# Patient Record
Sex: FEMALE | Race: WHITE | HISPANIC OR LATINO | Employment: UNEMPLOYED | URBAN - METROPOLITAN AREA
[De-identification: names, ages, dates, MRNs, and addresses within clinical notes are randomized per-mention and may not be internally consistent; named-entity substitution may affect disease eponyms.]

---
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
## 2024-02-20 ENCOUNTER — HOSPITAL ENCOUNTER (OUTPATIENT)
Dept: LAB | Facility: MEDICAL CENTER | Age: 24
End: 2024-02-20
Attending: NURSE PRACTITIONER
Payer: COMMERCIAL

## 2024-02-20 ENCOUNTER — HOSPITAL ENCOUNTER (EMERGENCY)
Facility: MEDICAL CENTER | Age: 24
End: 2024-02-20
Attending: OBSTETRICS & GYNECOLOGY | Admitting: OBSTETRICS & GYNECOLOGY
Payer: COMMERCIAL

## 2024-02-20 VITALS
RESPIRATION RATE: 16 BRPM | HEIGHT: 62 IN | TEMPERATURE: 97.4 F | HEART RATE: 66 BPM | OXYGEN SATURATION: 98 % | BODY MASS INDEX: 33.13 KG/M2 | SYSTOLIC BLOOD PRESSURE: 142 MMHG | WEIGHT: 180 LBS | DIASTOLIC BLOOD PRESSURE: 67 MMHG

## 2024-02-20 LAB
ALBUMIN SERPL BCP-MCNC: 3.6 G/DL (ref 3.2–4.9)
ALBUMIN/GLOB SERPL: 1 G/DL
ALP SERPL-CCNC: 193 U/L (ref 30–99)
ALT SERPL-CCNC: 14 U/L (ref 2–50)
ANION GAP SERPL CALC-SCNC: 13 MMOL/L (ref 7–16)
APPEARANCE UR: ABNORMAL
AST SERPL-CCNC: 22 U/L (ref 12–45)
BACTERIA #/AREA URNS HPF: ABNORMAL /HPF
BACTERIA #/AREA URNS HPF: ABNORMAL /HPF
BILIRUB SERPL-MCNC: 0.3 MG/DL (ref 0.1–1.5)
BILIRUB UR QL STRIP.AUTO: NEGATIVE
BILIRUB UR QL STRIP.AUTO: NEGATIVE
BUN SERPL-MCNC: 6 MG/DL (ref 8–22)
CALCIUM ALBUM COR SERPL-MCNC: 9.2 MG/DL (ref 8.5–10.5)
CALCIUM SERPL-MCNC: 8.9 MG/DL (ref 8.5–10.5)
CHLORIDE SERPL-SCNC: 106 MMOL/L (ref 96–112)
CO2 SERPL-SCNC: 18 MMOL/L (ref 20–33)
COLOR UR AUTO: YELLOW
COLOR UR: YELLOW
COLOR UR: YELLOW
CREAT SERPL-MCNC: 0.44 MG/DL (ref 0.5–1.4)
CREAT UR-MCNC: 148.4 MG/DL
EPI CELLS #/AREA URNS HPF: ABNORMAL /HPF
EPI CELLS #/AREA URNS HPF: ABNORMAL /HPF
ERYTHROCYTE [DISTWIDTH] IN BLOOD BY AUTOMATED COUNT: 40.5 FL (ref 35.9–50)
GFR SERPLBLD CREATININE-BSD FMLA CKD-EPI: 140 ML/MIN/1.73 M 2
GLOBULIN SER CALC-MCNC: 3.5 G/DL (ref 1.9–3.5)
GLUCOSE SERPL-MCNC: 75 MG/DL (ref 65–99)
GLUCOSE UR QL STRIP.AUTO: NEGATIVE MG/DL
GLUCOSE UR STRIP.AUTO-MCNC: NEGATIVE MG/DL
GLUCOSE UR STRIP.AUTO-MCNC: NEGATIVE MG/DL
HCT VFR BLD AUTO: 40.3 % (ref 37–47)
HGB BLD-MCNC: 13.7 G/DL (ref 12–16)
KETONES UR QL STRIP.AUTO: NEGATIVE MG/DL
KETONES UR STRIP.AUTO-MCNC: NEGATIVE MG/DL
KETONES UR STRIP.AUTO-MCNC: NEGATIVE MG/DL
LEUKOCYTE ESTERASE UR QL STRIP.AUTO: ABNORMAL
MCH RBC QN AUTO: 28.5 PG (ref 27–33)
MCHC RBC AUTO-ENTMCNC: 34 G/DL (ref 32.2–35.5)
MCV RBC AUTO: 84 FL (ref 81.4–97.8)
MICRO URNS: ABNORMAL
MICRO URNS: ABNORMAL
NITRITE UR QL STRIP.AUTO: NEGATIVE
PH UR STRIP.AUTO: 6.5 [PH] (ref 5–8)
PH UR STRIP.AUTO: 6.5 [PH] (ref 5–8)
PH UR STRIP.AUTO: 7 [PH] (ref 5–8)
PLATELET # BLD AUTO: 242 K/UL (ref 164–446)
PMV BLD AUTO: 10 FL (ref 9–12.9)
POTASSIUM SERPL-SCNC: 4.2 MMOL/L (ref 3.6–5.5)
PROT SERPL-MCNC: 7.1 G/DL (ref 6–8.2)
PROT UR QL STRIP: 30 MG/DL
PROT UR QL STRIP: NEGATIVE MG/DL
PROT UR QL STRIP: NEGATIVE MG/DL
PROT UR-MCNC: 35 MG/DL (ref 0–15)
PROT/CREAT UR: 236 MG/G (ref 10–107)
RBC # BLD AUTO: 4.8 M/UL (ref 4.2–5.4)
RBC # URNS HPF: ABNORMAL /HPF
RBC # URNS HPF: ABNORMAL /HPF
RBC UR QL AUTO: ABNORMAL
RBC UR QL AUTO: ABNORMAL
RBC UR QL AUTO: NEGATIVE
SODIUM SERPL-SCNC: 137 MMOL/L (ref 135–145)
SP GR UR STRIP.AUTO: 1.01
SP GR UR STRIP.AUTO: 1.02
SP GR UR STRIP.AUTO: 1.02 (ref 1–1.03)
URATE SERPL-MCNC: 4.9 MG/DL (ref 1.9–8.2)
UROBILINOGEN UR STRIP.AUTO-MCNC: 0.2 MG/DL
UROBILINOGEN UR STRIP.AUTO-MCNC: 0.2 MG/DL
WBC # BLD AUTO: 9.8 K/UL (ref 4.8–10.8)
WBC #/AREA URNS HPF: ABNORMAL /HPF
WBC #/AREA URNS HPF: ABNORMAL /HPF

## 2024-02-20 PROCEDURE — 84550 ASSAY OF BLOOD/URIC ACID: CPT

## 2024-02-20 PROCEDURE — 87077 CULTURE AEROBIC IDENTIFY: CPT

## 2024-02-20 PROCEDURE — 82570 ASSAY OF URINE CREATININE: CPT

## 2024-02-20 PROCEDURE — 302449 STATCHG TRIAGE ONLY (STATISTIC)

## 2024-02-20 PROCEDURE — 59025 FETAL NON-STRESS TEST: CPT

## 2024-02-20 PROCEDURE — 80053 COMPREHEN METABOLIC PANEL: CPT

## 2024-02-20 PROCEDURE — 81001 URINALYSIS AUTO W/SCOPE: CPT

## 2024-02-20 PROCEDURE — 84156 ASSAY OF PROTEIN URINE: CPT

## 2024-02-20 PROCEDURE — 36415 COLL VENOUS BLD VENIPUNCTURE: CPT

## 2024-02-20 PROCEDURE — 81002 URINALYSIS NONAUTO W/O SCOPE: CPT

## 2024-02-20 PROCEDURE — 81001 URINALYSIS AUTO W/SCOPE: CPT | Mod: 91

## 2024-02-20 PROCEDURE — 87086 URINE CULTURE/COLONY COUNT: CPT

## 2024-02-20 PROCEDURE — 85027 COMPLETE CBC AUTOMATED: CPT

## 2024-02-20 ASSESSMENT — FIBROSIS 4 INDEX: FIB4 SCORE: .5345224838248487693

## 2024-02-20 ASSESSMENT — PAIN SCALES - GENERAL: PAINLEVEL: 0 - NO PAIN

## 2024-02-21 NOTE — PROGRESS NOTES
G 1 P 0 EDC 3/6/24 @ 37.6 WKS     1906 - Pt and FOB ambulate to LDA 2 in stable condition. Patient presents to L & D after being told by provider to stop by to have her BP assessed while visiting the hospital today. Patient denies CTX/LOF/VB, affirms + FM. Patient denies HA/RUQ pain/Visual changes, Clonus absent, DTR WNL. VSS, BP set to cycle. Call light within reach. Patient provided with PO hydration.     1938 - Report given to Dr.Sta Pitts. Orders to monitor, obtain NST. Discharge if VSS, NST reactive. Pt educated regarding symptoms to monitor for, assessing BP and when to return. All questions answered, no further needs stated at this time.     2034 - Dr.Sta Pitts given report. Discharge order received.     2104 - Discharge instructions, labor precautions, FKC, PIH symptom monitoring, follow up and when to return reviewed with pt and FOB. All questions answered, no further needs stated at this time.     2107 - Pt and FOB ambulate off unit in stable condition at this time.

## 2024-02-22 ENCOUNTER — HOSPITAL ENCOUNTER (OUTPATIENT)
Facility: MEDICAL CENTER | Age: 24
End: 2024-02-22
Attending: OBSTETRICS & GYNECOLOGY
Payer: COMMERCIAL

## 2024-02-22 PROCEDURE — 84156 ASSAY OF PROTEIN URINE: CPT

## 2024-02-22 PROCEDURE — 81050 URINALYSIS VOLUME MEASURE: CPT

## 2024-02-22 PROCEDURE — 82575 CREATININE CLEARANCE TEST: CPT

## 2024-02-23 LAB
BACTERIA UR CULT: ABNORMAL
BACTERIA UR CULT: ABNORMAL
COLLECT DURATION TIME UR: 24 HR
CREAT CL/1.73 SQ M ?TM UR+SERPL-ARVRAT: 277 ML/MIN (ref 88–128)
CREAT SERPL-MCNC: 0.5 MG/DL (ref 0.5–1.4)
CREAT UR-MCNC: 71.86 MG/DL
PROT 24H UR-MCNC: 174 MG/24 HR (ref 30–150)
PROT 24H UR-MRATE: 12 MG/DL (ref 0–15)
SIGNIFICANT IND 70042: ABNORMAL
SITE SITE: ABNORMAL
SOURCE SOURCE: ABNORMAL
SPECIMEN VOL UR: 1450 ML
SPECIMEN VOL UR: 3150 ML
URINE CREATININE EXCRETED 1125: 2264 MG/24 HR

## 2024-02-28 ENCOUNTER — ANESTHESIA (OUTPATIENT)
Dept: OBGYN | Facility: MEDICAL CENTER | Age: 24
End: 2024-02-28
Payer: COMMERCIAL

## 2024-02-28 ENCOUNTER — HOSPITAL ENCOUNTER (INPATIENT)
Facility: MEDICAL CENTER | Age: 24
LOS: 4 days | End: 2024-03-03
Attending: OBSTETRICS & GYNECOLOGY | Admitting: OBSTETRICS & GYNECOLOGY
Payer: COMMERCIAL

## 2024-02-28 ENCOUNTER — ANESTHESIA EVENT (OUTPATIENT)
Dept: OBGYN | Facility: MEDICAL CENTER | Age: 24
End: 2024-02-28
Payer: COMMERCIAL

## 2024-02-28 DIAGNOSIS — G89.18 POSTOPERATIVE PAIN: ICD-10-CM

## 2024-02-28 LAB
ALBUMIN SERPL BCP-MCNC: 3.5 G/DL (ref 3.2–4.9)
ALBUMIN/GLOB SERPL: 0.9 G/DL
ALP SERPL-CCNC: 209 U/L (ref 30–99)
ALT SERPL-CCNC: 14 U/L (ref 2–50)
ANION GAP SERPL CALC-SCNC: 15 MMOL/L (ref 7–16)
AST SERPL-CCNC: 20 U/L (ref 12–45)
BASOPHILS # BLD AUTO: 0.2 % (ref 0–1.8)
BASOPHILS # BLD: 0.02 K/UL (ref 0–0.12)
BILIRUB SERPL-MCNC: 0.2 MG/DL (ref 0.1–1.5)
BUN SERPL-MCNC: 9 MG/DL (ref 8–22)
CALCIUM ALBUM COR SERPL-MCNC: 9.6 MG/DL (ref 8.5–10.5)
CALCIUM SERPL-MCNC: 9.2 MG/DL (ref 8.5–10.5)
CHLORIDE SERPL-SCNC: 106 MMOL/L (ref 96–112)
CO2 SERPL-SCNC: 17 MMOL/L (ref 20–33)
CREAT SERPL-MCNC: 0.37 MG/DL (ref 0.5–1.4)
CREAT UR-MCNC: 42.39 MG/DL
EOSINOPHIL # BLD AUTO: 0.03 K/UL (ref 0–0.51)
EOSINOPHIL NFR BLD: 0.2 % (ref 0–6.9)
ERYTHROCYTE [DISTWIDTH] IN BLOOD BY AUTOMATED COUNT: 39.7 FL (ref 35.9–50)
GFR SERPLBLD CREATININE-BSD FMLA CKD-EPI: 146 ML/MIN/1.73 M 2
GLOBULIN SER CALC-MCNC: 3.8 G/DL (ref 1.9–3.5)
GLUCOSE SERPL-MCNC: 85 MG/DL (ref 65–99)
HCT VFR BLD AUTO: 42.4 % (ref 37–47)
HGB BLD-MCNC: 14.4 G/DL (ref 12–16)
HOLDING TUBE BB 8507: NORMAL
IMM GRANULOCYTES # BLD AUTO: 0.08 K/UL (ref 0–0.11)
IMM GRANULOCYTES NFR BLD AUTO: 0.7 % (ref 0–0.9)
LYMPHOCYTES # BLD AUTO: 2.52 K/UL (ref 1–4.8)
LYMPHOCYTES NFR BLD: 20.5 % (ref 22–41)
MCH RBC QN AUTO: 28.1 PG (ref 27–33)
MCHC RBC AUTO-ENTMCNC: 34 G/DL (ref 32.2–35.5)
MCV RBC AUTO: 82.8 FL (ref 81.4–97.8)
MONOCYTES # BLD AUTO: 0.57 K/UL (ref 0–0.85)
MONOCYTES NFR BLD AUTO: 4.6 % (ref 0–13.4)
NEUTROPHILS # BLD AUTO: 9.05 K/UL (ref 1.82–7.42)
NEUTROPHILS NFR BLD: 73.8 % (ref 44–72)
NRBC # BLD AUTO: 0 K/UL
NRBC BLD-RTO: 0 /100 WBC (ref 0–0.2)
PLATELET # BLD AUTO: 258 K/UL (ref 164–446)
PMV BLD AUTO: 9.8 FL (ref 9–12.9)
POTASSIUM SERPL-SCNC: 4.2 MMOL/L (ref 3.6–5.5)
PROT SERPL-MCNC: 7.3 G/DL (ref 6–8.2)
PROT UR-MCNC: 30 MG/DL (ref 0–15)
PROT/CREAT UR: 708 MG/G (ref 10–107)
RBC # BLD AUTO: 5.12 M/UL (ref 4.2–5.4)
SODIUM SERPL-SCNC: 138 MMOL/L (ref 135–145)
T PALLIDUM AB SER QL IA: NORMAL
WBC # BLD AUTO: 12.3 K/UL (ref 4.8–10.8)

## 2024-02-28 PROCEDURE — 303615 HCHG EPIDURAL/SPINAL ANESTHESIA FOR LABOR

## 2024-02-28 PROCEDURE — 700111 HCHG RX REV CODE 636 W/ 250 OVERRIDE (IP): Mod: JZ

## 2024-02-28 PROCEDURE — 700101 HCHG RX REV CODE 250: Performed by: ANESTHESIOLOGY

## 2024-02-28 PROCEDURE — 36415 COLL VENOUS BLD VENIPUNCTURE: CPT

## 2024-02-28 PROCEDURE — 770002 HCHG ROOM/CARE - OB PRIVATE (112)

## 2024-02-28 PROCEDURE — 85025 COMPLETE CBC W/AUTO DIFF WBC: CPT

## 2024-02-28 PROCEDURE — 84156 ASSAY OF PROTEIN URINE: CPT

## 2024-02-28 PROCEDURE — A9270 NON-COVERED ITEM OR SERVICE: HCPCS | Performed by: OBSTETRICS & GYNECOLOGY

## 2024-02-28 PROCEDURE — 700111 HCHG RX REV CODE 636 W/ 250 OVERRIDE (IP): Mod: JZ | Performed by: ANESTHESIOLOGY

## 2024-02-28 PROCEDURE — 700111 HCHG RX REV CODE 636 W/ 250 OVERRIDE (IP): Mod: JZ | Performed by: OBSTETRICS & GYNECOLOGY

## 2024-02-28 PROCEDURE — 86780 TREPONEMA PALLIDUM: CPT

## 2024-02-28 PROCEDURE — 700111 HCHG RX REV CODE 636 W/ 250 OVERRIDE (IP): Performed by: ANESTHESIOLOGY

## 2024-02-28 PROCEDURE — 700105 HCHG RX REV CODE 258: Performed by: SPECIALIST

## 2024-02-28 PROCEDURE — 80053 COMPREHEN METABOLIC PANEL: CPT

## 2024-02-28 PROCEDURE — 700105 HCHG RX REV CODE 258: Performed by: OBSTETRICS & GYNECOLOGY

## 2024-02-28 PROCEDURE — 700102 HCHG RX REV CODE 250 W/ 637 OVERRIDE(OP): Performed by: OBSTETRICS & GYNECOLOGY

## 2024-02-28 PROCEDURE — 82570 ASSAY OF URINE CREATININE: CPT

## 2024-02-28 RX ORDER — BUPIVACAINE HYDROCHLORIDE 2.5 MG/ML
INJECTION, SOLUTION EPIDURAL; INFILTRATION; INTRACAUDAL
Status: COMPLETED
Start: 2024-02-28 | End: 2024-02-28

## 2024-02-28 RX ORDER — ONDANSETRON 2 MG/ML
4 INJECTION INTRAMUSCULAR; INTRAVENOUS EVERY 6 HOURS PRN
Status: DISCONTINUED | OUTPATIENT
Start: 2024-02-28 | End: 2024-02-29 | Stop reason: HOSPADM

## 2024-02-28 RX ORDER — LIDOCAINE HYDROCHLORIDE AND EPINEPHRINE 15; 5 MG/ML; UG/ML
INJECTION, SOLUTION EPIDURAL
Status: COMPLETED | OUTPATIENT
Start: 2024-02-28 | End: 2024-02-28

## 2024-02-28 RX ORDER — ALUMINA, MAGNESIA, AND SIMETHICONE 2400; 2400; 240 MG/30ML; MG/30ML; MG/30ML
30 SUSPENSION ORAL EVERY 6 HOURS PRN
Status: DISCONTINUED | OUTPATIENT
Start: 2024-02-28 | End: 2024-02-29 | Stop reason: HOSPADM

## 2024-02-28 RX ORDER — IBUPROFEN 800 MG/1
800 TABLET ORAL
Status: DISCONTINUED | OUTPATIENT
Start: 2024-02-28 | End: 2024-02-29 | Stop reason: HOSPADM

## 2024-02-28 RX ORDER — ROPIVACAINE HYDROCHLORIDE 2 MG/ML
INJECTION, SOLUTION EPIDURAL; INFILTRATION; PERINEURAL CONTINUOUS
Status: DISCONTINUED | OUTPATIENT
Start: 2024-02-28 | End: 2024-03-01 | Stop reason: HOSPADM

## 2024-02-28 RX ORDER — SODIUM CHLORIDE, SODIUM LACTATE, POTASSIUM CHLORIDE, AND CALCIUM CHLORIDE .6; .31; .03; .02 G/100ML; G/100ML; G/100ML; G/100ML
1000 INJECTION, SOLUTION INTRAVENOUS
Status: DISCONTINUED | OUTPATIENT
Start: 2024-02-28 | End: 2024-02-29 | Stop reason: HOSPADM

## 2024-02-28 RX ORDER — ONDANSETRON 4 MG/1
4 TABLET, ORALLY DISINTEGRATING ORAL EVERY 6 HOURS PRN
Status: DISCONTINUED | OUTPATIENT
Start: 2024-02-28 | End: 2024-02-29 | Stop reason: HOSPADM

## 2024-02-28 RX ORDER — SODIUM CHLORIDE, SODIUM LACTATE, POTASSIUM CHLORIDE, AND CALCIUM CHLORIDE .6; .31; .03; .02 G/100ML; G/100ML; G/100ML; G/100ML
250 INJECTION, SOLUTION INTRAVENOUS PRN
Status: DISCONTINUED | OUTPATIENT
Start: 2024-02-28 | End: 2024-02-29 | Stop reason: HOSPADM

## 2024-02-28 RX ORDER — PNV 119/IRON FUM/FOLIC ACID 29 MG-1 MG
TABLET ORAL
COMMUNITY

## 2024-02-28 RX ORDER — ACETAMINOPHEN 500 MG
1000 TABLET ORAL ONCE
Status: COMPLETED | OUTPATIENT
Start: 2024-02-28 | End: 2024-02-28

## 2024-02-28 RX ORDER — TERBUTALINE SULFATE 1 MG/ML
0.25 INJECTION, SOLUTION SUBCUTANEOUS
Status: DISCONTINUED | OUTPATIENT
Start: 2024-02-28 | End: 2024-02-29 | Stop reason: HOSPADM

## 2024-02-28 RX ORDER — LIDOCAINE HYDROCHLORIDE 10 MG/ML
20 INJECTION, SOLUTION INFILTRATION; PERINEURAL
Status: DISCONTINUED | OUTPATIENT
Start: 2024-02-28 | End: 2024-02-29 | Stop reason: HOSPADM

## 2024-02-28 RX ORDER — OXYTOCIN 10 [USP'U]/ML
10 INJECTION, SOLUTION INTRAMUSCULAR; INTRAVENOUS
Status: DISCONTINUED | OUTPATIENT
Start: 2024-02-28 | End: 2024-02-29 | Stop reason: HOSPADM

## 2024-02-28 RX ORDER — HYDROXYZINE 50 MG/1
50 TABLET, FILM COATED ORAL EVERY 6 HOURS PRN
Status: DISCONTINUED | OUTPATIENT
Start: 2024-02-28 | End: 2024-02-29 | Stop reason: HOSPADM

## 2024-02-28 RX ORDER — BUPIVACAINE HYDROCHLORIDE 2.5 MG/ML
INJECTION, SOLUTION EPIDURAL; INFILTRATION; INTRACAUDAL PRN
Status: DISCONTINUED | OUTPATIENT
Start: 2024-02-28 | End: 2024-02-29 | Stop reason: SURG

## 2024-02-28 RX ORDER — SODIUM CHLORIDE, SODIUM LACTATE, POTASSIUM CHLORIDE, CALCIUM CHLORIDE 600; 310; 30; 20 MG/100ML; MG/100ML; MG/100ML; MG/100ML
INJECTION, SOLUTION INTRAVENOUS CONTINUOUS
Status: DISCONTINUED | OUTPATIENT
Start: 2024-02-28 | End: 2024-03-01 | Stop reason: HOSPADM

## 2024-02-28 RX ORDER — ROPIVACAINE HYDROCHLORIDE 2 MG/ML
INJECTION, SOLUTION EPIDURAL; INFILTRATION; PERINEURAL
Status: COMPLETED
Start: 2024-02-28 | End: 2024-02-28

## 2024-02-28 RX ORDER — ACETAMINOPHEN 500 MG
1000 TABLET ORAL
Status: COMPLETED | OUTPATIENT
Start: 2024-02-28 | End: 2024-02-29

## 2024-02-28 RX ORDER — EPHEDRINE SULFATE 50 MG/ML
5 INJECTION, SOLUTION INTRAVENOUS
Status: DISCONTINUED | OUTPATIENT
Start: 2024-02-28 | End: 2024-02-29 | Stop reason: HOSPADM

## 2024-02-28 RX ADMIN — AMPICILLIN SODIUM 2000 MG: 2 INJECTION, POWDER, FOR SOLUTION INTRAVENOUS at 20:30

## 2024-02-28 RX ADMIN — OXYTOCIN 2 MILLI-UNITS/MIN: 10 INJECTION, SOLUTION INTRAMUSCULAR; INTRAVENOUS at 08:46

## 2024-02-28 RX ADMIN — ROPIVACAINE HYDROCHLORIDE: 2 INJECTION, SOLUTION EPIDURAL; INFILTRATION at 13:27

## 2024-02-28 RX ADMIN — SODIUM CHLORIDE, POTASSIUM CHLORIDE, SODIUM LACTATE AND CALCIUM CHLORIDE: 600; 310; 30; 20 INJECTION, SOLUTION INTRAVENOUS at 18:12

## 2024-02-28 RX ADMIN — ROPIVACAINE HYDROCHLORIDE: 2 INJECTION, SOLUTION EPIDURAL; INFILTRATION at 22:03

## 2024-02-28 RX ADMIN — GENTAMICIN SULFATE 360 MG: 40 INJECTION, SOLUTION INTRAMUSCULAR; INTRAVENOUS at 21:17

## 2024-02-28 RX ADMIN — FENTANYL CITRATE 100 MCG: 50 INJECTION, SOLUTION INTRAMUSCULAR; INTRAVENOUS at 13:23

## 2024-02-28 RX ADMIN — SODIUM CHLORIDE, POTASSIUM CHLORIDE, SODIUM LACTATE AND CALCIUM CHLORIDE: 600; 310; 30; 20 INJECTION, SOLUTION INTRAVENOUS at 07:58

## 2024-02-28 RX ADMIN — ACETAMINOPHEN 1000 MG: 500 TABLET ORAL at 18:52

## 2024-02-28 RX ADMIN — LIDOCAINE HYDROCHLORIDE,EPINEPHRINE BITARTRATE 3 ML: 15; .005 INJECTION, SOLUTION EPIDURAL; INFILTRATION; INTRACAUDAL; PERINEURAL at 13:18

## 2024-02-28 RX ADMIN — BUPIVACAINE HYDROCHLORIDE 4 ML: 2.5 INJECTION, SOLUTION EPIDURAL; INFILTRATION; INTRACAUDAL at 13:23

## 2024-02-28 ASSESSMENT — PATIENT HEALTH QUESTIONNAIRE - PHQ9
SUM OF ALL RESPONSES TO PHQ9 QUESTIONS 1 AND 2: 0
1. LITTLE INTEREST OR PLEASURE IN DOING THINGS: NOT AT ALL
2. FEELING DOWN, DEPRESSED, IRRITABLE, OR HOPELESS: NOT AT ALL

## 2024-02-28 ASSESSMENT — LIFESTYLE VARIABLES
EVER HAD A DRINK FIRST THING IN THE MORNING TO STEADY YOUR NERVES TO GET RID OF A HANGOVER: NO
HOW MANY TIMES IN THE PAST YEAR HAVE YOU HAD 5 OR MORE DRINKS IN A DAY: 0
CONSUMPTION TOTAL: NEGATIVE
TOTAL SCORE: 0
HAVE PEOPLE ANNOYED YOU BY CRITICIZING YOUR DRINKING: NO
EVER FELT BAD OR GUILTY ABOUT YOUR DRINKING: NO
DOES PATIENT WANT TO STOP DRINKING: NO
EVER_SMOKED: NEVER
ON A TYPICAL DAY WHEN YOU DRINK ALCOHOL HOW MANY DRINKS DO YOU HAVE: 0
TOTAL SCORE: 0
AVERAGE NUMBER OF DAYS PER WEEK YOU HAVE A DRINK CONTAINING ALCOHOL: 0
HAVE YOU EVER FELT YOU SHOULD CUT DOWN ON YOUR DRINKING: NO
ALCOHOL_USE: NO
TOTAL SCORE: 0

## 2024-02-28 ASSESSMENT — PAIN DESCRIPTION - PAIN TYPE
TYPE: ACUTE PAIN

## 2024-02-28 ASSESSMENT — FIBROSIS 4 INDEX: FIB4 SCORE: .5345224838248487693

## 2024-02-28 NOTE — ANESTHESIA PREPROCEDURE EVALUATION
Date: 02/28/24  Procedure: Labor Epidural         Relevant Problems   No relevant active problems     IUP, requesting labor epidural    Physical Exam    Airway   Mallampati: II  TM distance: >3 FB  Neck ROM: full       Cardiovascular - normal exam  Rhythm: regular  Rate: normal  (-) murmur     Dental - normal exam           Pulmonary - normal exam  Breath sounds clear to auscultation     Abdominal    Neurological - normal exam                   Anesthesia Plan    ASA 2       Plan - epidural   Neuraxial block will be labor analgesia                  Pertinent diagnostic labs and testing reviewed    Informed Consent:    Anesthetic plan and risks discussed with patient.    Use of blood products discussed with: patient whom consented to blood products.

## 2024-02-28 NOTE — PROGRESS NOTES
6691- Report received from DONG Browne. Pt breathing through contractions well at this time. EFM and TOCO in place and adjusted. Pitocin running at 2 lesley-units/min and LR at 125 ml/hr. Pt education provided regarding monitors and POC. Pt wants to labor naturally at this time and education provided regarding epidural and  process. Pt will notify RN if she needs any interventions. FOB and pt mom at bedside for support.     1100- Pt requesting epidural, LR bolus started. Anesthesia is in the OR at this time and will place when able, Pt aware and agrees.     1245- Dr Sheldon at bedside for epidural placement.     1318- Test dose for epidural. Pt tolerated well.     1325- Pt repositioned for comfort and feeling good pain relief at this time.     1415- Delong placed and draining clear yellow urine to gravity. SVE 4/100/-2.     1545- Report given to DONG DUONG

## 2024-02-28 NOTE — ANESTHESIA PROCEDURE NOTES
Epidural Block    Date/Time: 2/28/2024 1:18 PM    Performed by: Kaleigh Monique M.D.  Authorized by: Kaleigh Monique M.D.    Patient Location:  OB  Start Time:  2/28/2024 1:18 PM  Reason for Block: labor analgesia    patient identified, IV checked, site marked, risks and benefits discussed, surgical consent, monitors and equipment checked and pre-op evaluation    Patient Position:  Sitting  Prep: ChloraPrep, patient draped and sterile technique    Monitoring:  Blood pressure, continuous pulse oximetry and heart rate  Approach:  Midline  Location:  L3-L4  Injection Technique:  PINEDA air and PINEDA saline  Skin infiltration:  Lidocaine  Strength:  1%  Dose:  3ml  Needle Type:  Tuohy  Needle Gauge:  17 G  Needle Length:  3.5 in  Loss of resistance::  8  Catheter Size:  19 G  Catheter at Skin Depth:  14  Test Dose Result:  Negative

## 2024-02-28 NOTE — PROGRESS NOTES
EDC - 3/6 EGA - 39-0    0408 - Pt arrived to labor and delivery for contractions that started last night at 2200. Pt placed in room LDA1.  0425 - External monitors in place X2. Category I FHT at this time. BP elevated, otherwise VSS. Pt reports good FM. No complaints of ROM or vaginal bleeding. States contractions are now around every 2 minutes. SVE 2-3/100/-2. FOB and pt's mother at bedside. POC discussed with pt and family members, all questions answered.   0447 - Report given to Dr Cano. Admit orders received.   0530 - Report given. POC discussed. Pt tx to S217.

## 2024-02-28 NOTE — CARE PLAN
Problem: Knowledge Deficit - L&D  Goal: Patient and family/caregivers will demonstrate understanding of plan of care, disease process/condition, diagnostic tests and medications  Outcome: Progressing  Note: Education provided and all questions answered at this time     Problem: Pain  Goal: Patient's pain will be alleviated or reduced to the patient’s comfort goal  Outcome: Progressing  Note: Epidural education provided and pt would like to labor naturally at this time. Will notify RN when needs interventions     Problem: Psychosocial - L&D  Goal: Patient's level of anxiety will decrease  Outcome: Progressing  Flowsheets (Taken 2/28/2024 3059)  Decrease Anxiety Level:   Collaborated with patient to identify and develop coping strategies   Encouraged support system participation  Goal: Patient will be able to discuss coping skills during hospitalization  Outcome: Progressing     Problem: Risk for Injury  Goal: Patient and fetus will be free of preventable injury/complications  Outcome: Progressing  Note: Continuously monitoring fetal strip during labor process. Will intervene with interventions as necessary.    The patient is Watcher - Medium risk of patient condition declining or worsening    Shift Goals  Clinical Goals: safe and healthy delviery and healthy mom  Patient Goals: successful vaginal delivery  Family Goals: provide support    Progress made toward(s) clinical / shift goals: Pt in participating in labor process and fetal strip currently being monitored closely.

## 2024-02-28 NOTE — PROGRESS NOTES
0530: Report received from ZHEN Thao discussed. Pt moved to 217 for labor. FOB and pt mom at bedside.     1528: Report to Hollie UMANA. POC discussed.

## 2024-02-28 NOTE — H&P
History and Physical      Esther Bee is a 23 y.o. female  at 39w0d who presents for contractions    Subjective:   positive  For CTXS.   positive Feels pain   negative for LOF  negative for vaginal bleeding.   positive for fetal movement    ROS: A comprehensive review of systems was negative.    Past Medical History:   Diagnosis Date    Asthma      History reviewed. No pertinent surgical history.  Family History   Problem Relation Age of Onset    Diabetes Maternal Grandfather     Hypertension Maternal Grandfather      OB History    Para Term  AB Living   1             SAB IAB Ectopic Molar Multiple Live Births                    # Outcome Date GA Lbr Amauri/2nd Weight Sex Delivery Anes PTL Lv   1 Current              Social History     Tobacco Use    Smoking status: Never    Smokeless tobacco: Never   Substance Use Topics    Alcohol use: No    Drug use: No     Allergies: Patient has no known allergies.    Current Facility-Administered Medications:     LR infusion, , Intravenous, Continuous, Delfin Cano M.D., Last Rate: 125 mL/hr at 24 0758, New Bag at 24 0758    lidocaine (XYLOCAINE) 1%  injection, 20 mL, Subcutaneous, Once PRN, Delfin Cano M.D.    terbutaline (Brethine) injection 0.25 mg, 0.25 mg, Subcutaneous, Once PRN, Delfin Cano M.D.    oxytocin (Pitocin) infusion bolus (for post delivery), 20 Units, Intravenous, Once, Held at 24 0515 **FOLLOWED BY** oxytocin (Pitocin) infusion (for post delivery), 125 mL/hr, Intravenous, Continuous, Delfin Cano M.D., Held at 24 0600    oxytocin (Pitocin) injection 10 Units, 10 Units, Intramuscular, Once PRN, Delfin Cano M.D.    ibuprofen (Motrin) tablet 800 mg, 800 mg, Oral, Once PRN, Delfin Cano M.D.    acetaminophen (Tylenol) tablet 1,000 mg, 1,000 mg, Oral, Once PRN, Delfin Cano M.D.    fentaNYL (Sublimaze) injection 50 mcg, 50 mcg, Intravenous, Q HOUR PRN **OR** fentaNYL  "(Sublimaze) injection 100 mcg, 100 mcg, Intravenous, Q HOUR PRN, Delfin Cano M.D.    ondansetron (Zofran ODT) dispertab 4 mg, 4 mg, Oral, Q6HRS PRN **OR** ondansetron (Zofran) syringe/vial injection 4 mg, 4 mg, Intravenous, Q6HRS PRN, Delfin Cano M.D.    mag hydrox-al hydrox-simeth (Maalox Plus Es Or Mylanta Ds) suspension 30 mL, 30 mL, Oral, Q6HRS PRN, Delfin Cano M.D.    hydrOXYzine HCl (Atarax) tablet 50 mg, 50 mg, Oral, Q6HRS PRN, Delfin Cano M.D.    oxytocin (Pitocin) 0.02 Units/mL LR (induction of labor), 0.5-20 lesley-units/min, Intravenous, Continuous, Gisselle Calderón M.D.    Prenatal care with vikash maier MD:   Patient Active Problem List    Diagnosis Date Noted    Labor and delivery indication for care or intervention 02/28/2024    Chronic SI joint pain 03/04/2020    Overweight, pediatric, BMI (body mass index) 95-99% for age 01/24/2018    Obesity, pediatric, BMI 95th to 98th percentile for age 07/15/2016    High triglycerides 07/15/2016    Vitamin D deficiency 07/15/2016    Hyperinsulinemia 07/15/2016    Right-sided low back pain without sciatica 07/15/2016       Objective:      BP (!) 139/99   Pulse 80   Temp 36.2 °C (97.2 °F)   Resp 18   Ht 1.575 m (5' 2\")   Wt 95.3 kg (210 lb)   SpO2 97%     General:   no acute distress, alert, cooperative   Skin:   normal   HEENT:  Sclera clear, anicteric   Lungs:   CTA bilateral   Heart:   S1, S2 normal, no murmur, click, rub or gallop, regular rate and rhythm, no hepatosplenomegaly, S1, S2 normal,\"no JVD   Abdomen:   gravid, NT   EFW:  3400   Pelvis:  adequate with gynecoid pelvis   FHT:  150 BPM   Uterine Size: S=D   Presentations: Cephalic   Cervix:     Dilation: 3cm    Effacement: 100%    Station:  -3    Consistency: Soft    Position: Middle     Lab Review  Recent Results (from the past 5880 hour(s))   Comp Metabolic Panel    Collection Time: 02/20/24 11:31 AM   Result Value Ref Range    Sodium 137 135 - 145 mmol/L    " Potassium 4.2 3.6 - 5.5 mmol/L    Chloride 106 96 - 112 mmol/L    Co2 18 (L) 20 - 33 mmol/L    Anion Gap 13.0 7.0 - 16.0    Glucose 75 65 - 99 mg/dL    Bun 6 (L) 8 - 22 mg/dL    Creatinine 0.44 (L) 0.50 - 1.40 mg/dL    Calcium 8.9 8.5 - 10.5 mg/dL    Correct Calcium 9.2 8.5 - 10.5 mg/dL    AST(SGOT) 22 12 - 45 U/L    ALT(SGPT) 14 2 - 50 U/L    Alkaline Phosphatase 193 (H) 30 - 99 U/L    Total Bilirubin 0.3 0.1 - 1.5 mg/dL    Albumin 3.6 3.2 - 4.9 g/dL    Total Protein 7.1 6.0 - 8.2 g/dL    Globulin 3.5 1.9 - 3.5 g/dL    A-G Ratio 1.0 g/dL   URIC ACID    Collection Time: 02/20/24 11:31 AM   Result Value Ref Range    Uric Acid 4.9 1.9 - 8.2 mg/dL   CBC WITHOUT DIFFERENTIAL    Collection Time: 02/20/24 11:31 AM   Result Value Ref Range    WBC 9.8 4.8 - 10.8 K/uL    RBC 4.80 4.20 - 5.40 M/uL    Hemoglobin 13.7 12.0 - 16.0 g/dL    Hematocrit 40.3 37.0 - 47.0 %    MCV 84.0 81.4 - 97.8 fL    MCH 28.5 27.0 - 33.0 pg    MCHC 34.0 32.2 - 35.5 g/dL    RDW 40.5 35.9 - 50.0 fL    Platelet Count 242 164 - 446 K/uL    MPV 10.0 9.0 - 12.9 fL   URINALYSIS    Collection Time: 02/20/24 11:31 AM   Result Value Ref Range    Color Yellow     Character Cloudy (A)     Specific Gravity 1.024 <1.035    Ph 6.5 5.0 - 8.0    Glucose Negative Negative mg/dL    Ketones Negative Negative mg/dL    Protein 30 (A) Negative mg/dL    Bilirubin Negative Negative    Urobilinogen, Urine 0.2 Negative    Nitrite Negative Negative    Leukocyte Esterase Moderate (A) Negative    Occult Blood Negative Negative    Micro Urine Req Microscopic    PROTEIN/CREAT RATIO URINE    Collection Time: 02/20/24 11:31 AM   Result Value Ref Range    Total Protein, Urine 35.0 (H) 0.0 - 15.0 mg/dL    Creatinine, Random Urine 148.40 mg/dL    Protein Creatinine Ratio 236 (H) 10 - 107 mg/g   URINE MICROSCOPIC (W/UA)    Collection Time: 02/20/24 11:31 AM   Result Value Ref Range    WBC 10-20 (A) /hpf    RBC 0-2 /hpf    Bacteria Moderate (A) None /hpf    Epithelial Cells Many (A)  /hpf   ESTIMATED GFR    Collection Time: 02/20/24 11:31 AM   Result Value Ref Range    GFR (CKD-EPI) 140 >60 mL/min/1.73 m 2   POCT urinalysis device results    Collection Time: 02/20/24  7:41 PM   Result Value Ref Range    POC Color Yellow     POC Appearance Cloudy (A)     POC Glucose Negative Negative mg/dL    POC Ketones Negative Negative mg/dL    POC Specific Gravity 1.020 1.005 - 1.030    POC Blood Moderate (A) Negative    POC Urine PH 7.0 5.0 - 8.0    POC Protein Negative Negative mg/dL    POC Nitrites Negative Negative    POC Leukocyte Esterase Small (A) Negative   Urinalysis, Culture if indicated    Collection Time: 02/20/24  9:00 PM    Specimen: Urine, Clean Catch   Result Value Ref Range    Color Yellow     Character Cloudy (A)     Specific Gravity 1.013 <1.035    Ph 6.5 5.0 - 8.0    Glucose Negative Negative mg/dL    Ketones Negative Negative mg/dL    Protein Negative Negative mg/dL    Bilirubin Negative Negative    Urobilinogen, Urine 0.2 Negative    Nitrite Negative Negative    Leukocyte Esterase Large (A) Negative    Occult Blood Small (A) Negative    Micro Urine Req Microscopic    URINE MICROSCOPIC (W/UA)    Collection Time: 02/20/24  9:00 PM   Result Value Ref Range    WBC  (A) /hpf    RBC 0-2 /hpf    Bacteria Moderate (A) None /hpf    Epithelial Cells Many (A) /hpf   URINE CULTURE(NEW)    Collection Time: 02/20/24  9:00 PM    Specimen: Urine, Clean Catch   Result Value Ref Range    Significant Indicator POS (POS)     Source UR     Site URINE, CLEAN CATCH     Culture Result Usual urogenital kathy 10-50,000 cfu/mL (A)     Culture Result Gardnerella vaginalis  >100,000 cfu/mL   (A)    URINETOTAL PROTEIN 24 HR    Collection Time: 02/22/24  6:55 AM   Result Value Ref Range    Total Volume, Urine 1450 mL    Total Protein, Urine 12.0 0.0 - 15.0 mg/dL    Total Protein, 24 Hour Urine 174.0 (H) 30.0 - 150.0 mg/24 Hr   CREATININE CLEARANCE    Collection Time: 02/22/24  6:55 AM   Result Value Ref Range     Weight - Creatinine Clearance 95.3 kg    Height - Creatinine Clearance 158 cm    Collection Period, Urine 24 hr    Creatine Excreated 2264 mg/24 hr    Creat.Clearance 277 (H) 88 - 128 mL/min    Total Volume, Urine 3150 mL    Creatinine Plasma, Creatinine 0.50 0.50 - 1.40 mg/dL    Creatinine, Urine 71.86 mg/dL   Hold Blood Bank Specimen (Not Tested)    Collection Time: 02/28/24  5:30 AM   Result Value Ref Range    Holding Tube - Bb DONE    CBC with differential    Collection Time: 02/28/24  5:30 AM   Result Value Ref Range    WBC 12.3 (H) 4.8 - 10.8 K/uL    RBC 5.12 4.20 - 5.40 M/uL    Hemoglobin 14.4 12.0 - 16.0 g/dL    Hematocrit 42.4 37.0 - 47.0 %    MCV 82.8 81.4 - 97.8 fL    MCH 28.1 27.0 - 33.0 pg    MCHC 34.0 32.2 - 35.5 g/dL    RDW 39.7 35.9 - 50.0 fL    Platelet Count 258 164 - 446 K/uL    MPV 9.8 9.0 - 12.9 fL    Neutrophils-Polys 73.80 (H) 44.00 - 72.00 %    Lymphocytes 20.50 (L) 22.00 - 41.00 %    Monocytes 4.60 0.00 - 13.40 %    Eosinophils 0.20 0.00 - 6.90 %    Basophils 0.20 0.00 - 1.80 %    Immature Granulocytes 0.70 0.00 - 0.90 %    Nucleated RBC 0.00 0.00 - 0.20 /100 WBC    Neutrophils (Absolute) 9.05 (H) 1.82 - 7.42 K/uL    Lymphs (Absolute) 2.52 1.00 - 4.80 K/uL    Monos (Absolute) 0.57 0.00 - 0.85 K/uL    Eos (Absolute) 0.03 0.00 - 0.51 K/uL    Baso (Absolute) 0.02 0.00 - 0.12 K/uL    Immature Granulocytes (abs) 0.08 0.00 - 0.11 K/uL    NRBC (Absolute) 0.00 K/uL   T.PALLIDUM AB KEVON (Syphilis)    Collection Time: 02/28/24  5:30 AM   Result Value Ref Range    Syphilis, Treponemal Qual Non-Reactive Non-Reactive   Comp Metabolic Panel    Collection Time: 02/28/24  5:30 AM   Result Value Ref Range    Sodium 138 135 - 145 mmol/L    Potassium 4.2 3.6 - 5.5 mmol/L    Chloride 106 96 - 112 mmol/L    Co2 17 (L) 20 - 33 mmol/L    Anion Gap 15.0 7.0 - 16.0    Glucose 85 65 - 99 mg/dL    Bun 9 8 - 22 mg/dL    Creatinine 0.37 (L) 0.50 - 1.40 mg/dL    Calcium 9.2 8.5 - 10.5 mg/dL    Correct Calcium 9.6 8.5 -  10.5 mg/dL    AST(SGOT) 20 12 - 45 U/L    ALT(SGPT) 14 2 - 50 U/L    Alkaline Phosphatase 209 (H) 30 - 99 U/L    Total Bilirubin 0.2 0.1 - 1.5 mg/dL    Albumin 3.5 3.2 - 4.9 g/dL    Total Protein 7.3 6.0 - 8.2 g/dL    Globulin 3.8 (H) 1.9 - 3.5 g/dL    A-G Ratio 0.9 g/dL   ESTIMATED GFR    Collection Time: 02/28/24  5:30 AM   Result Value Ref Range    GFR (CKD-EPI) 146 >60 mL/min/1.73 m 2   PROTEIN/CREAT RATIO URINE    Collection Time: 02/28/24  5:52 AM   Result Value Ref Range    Total Protein, Urine 30.0 (H) 0.0 - 15.0 mg/dL    Creatinine, Random Urine 42.39 mg/dL    Protein Creatinine Ratio 708 (H) 10 - 107 mg/g        Assessment:   Esther Bee at 39w0d  Labor status: Early latent labor.  Obstetrical history significant for   Patient Active Problem List    Diagnosis Date Noted    Labor and delivery indication for care or intervention 02/28/2024    Chronic SI joint pain 03/04/2020    Overweight, pediatric, BMI (body mass index) 95-99% for age 01/24/2018    Obesity, pediatric, BMI 95th to 98th percentile for age 07/15/2016    High triglycerides 07/15/2016    Vitamin D deficiency 07/15/2016    Hyperinsulinemia 07/15/2016    Right-sided low back pain without sciatica 07/15/2016   .      Plan:     Admit to L&D  GBS negative  Start Pitocin  Epidural for pain mgt

## 2024-02-28 NOTE — PROGRESS NOTES
1545: Report from Evi VARELA RN     1634: Dr. Parveen Pitts at bedside SVE and AROM- CL     1830: SVE by this RN   Dr. Parveen Pitts notified of pt oral temp 100 F , FHT strip reviewed  Orders for Tylenol 1,000mg     : Dr. Parveen Pitts notified of oral temp 100.6F, orders received for antibiotics     : SVR by this RN- C/ +1    : Pushing begins     : Dr. Parveen Pitts at bedside to assess labor   Break from pushing, peanut ball, left side positioning, side lying release     : Begin pushing again     12: Dr. Parveen Pitts at bedside to assess pushing effort, pt states she is feeling weak and unable to push effectively.  FHT- tachycardia   Primary  section called for failure to descend/ fetal tachycardia     0032: Transfer pt to OR2     0053: Viable ,male infant delivered via c section. APGARs 7/7    0117: Pt in PACU1    0118: report from Dr. Murillo A&Ox4, no nausea or pain at this time     0140: Report to Yamile REBOLLEDO RN.

## 2024-02-29 LAB
ERYTHROCYTE [DISTWIDTH] IN BLOOD BY AUTOMATED COUNT: 40.3 FL (ref 35.9–50)
HCT VFR BLD AUTO: 36.2 % (ref 37–47)
HGB BLD-MCNC: 12.9 G/DL (ref 12–16)
MCH RBC QN AUTO: 29.3 PG (ref 27–33)
MCHC RBC AUTO-ENTMCNC: 35.6 G/DL (ref 32.2–35.5)
MCV RBC AUTO: 82.1 FL (ref 81.4–97.8)
PATHOLOGY CONSULT NOTE: NORMAL
PLATELET # BLD AUTO: 210 K/UL (ref 164–446)
PMV BLD AUTO: 9.5 FL (ref 9–12.9)
RBC # BLD AUTO: 4.41 M/UL (ref 4.2–5.4)
WBC # BLD AUTO: 25.2 K/UL (ref 4.8–10.8)

## 2024-02-29 PROCEDURE — 88307 TISSUE EXAM BY PATHOLOGIST: CPT

## 2024-02-29 PROCEDURE — 700102 HCHG RX REV CODE 250 W/ 637 OVERRIDE(OP): Performed by: ANESTHESIOLOGY

## 2024-02-29 PROCEDURE — 160002 HCHG RECOVERY MINUTES (STAT): Performed by: OBSTETRICS & GYNECOLOGY

## 2024-02-29 PROCEDURE — 700105 HCHG RX REV CODE 258: Performed by: ANESTHESIOLOGY

## 2024-02-29 PROCEDURE — 160041 HCHG SURGERY MINUTES - EA ADDL 1 MIN LEVEL 4: Performed by: OBSTETRICS & GYNECOLOGY

## 2024-02-29 PROCEDURE — 700101 HCHG RX REV CODE 250: Performed by: ANESTHESIOLOGY

## 2024-02-29 PROCEDURE — 85027 COMPLETE CBC AUTOMATED: CPT

## 2024-02-29 PROCEDURE — 700105 HCHG RX REV CODE 258: Performed by: SPECIALIST

## 2024-02-29 PROCEDURE — 700101 HCHG RX REV CODE 250: Performed by: OBSTETRICS & GYNECOLOGY

## 2024-02-29 PROCEDURE — 770002 HCHG ROOM/CARE - OB PRIVATE (112)

## 2024-02-29 PROCEDURE — A9270 NON-COVERED ITEM OR SERVICE: HCPCS | Performed by: SPECIALIST

## 2024-02-29 PROCEDURE — 36415 COLL VENOUS BLD VENIPUNCTURE: CPT

## 2024-02-29 PROCEDURE — 160009 HCHG ANES TIME/MIN: Performed by: OBSTETRICS & GYNECOLOGY

## 2024-02-29 PROCEDURE — 160048 HCHG OR STATISTICAL LEVEL 1-5: Performed by: OBSTETRICS & GYNECOLOGY

## 2024-02-29 PROCEDURE — 700105 HCHG RX REV CODE 258: Performed by: OBSTETRICS & GYNECOLOGY

## 2024-02-29 PROCEDURE — 700111 HCHG RX REV CODE 636 W/ 250 OVERRIDE (IP): Mod: JZ

## 2024-02-29 PROCEDURE — 160035 HCHG PACU - 1ST 60 MINS PHASE I: Performed by: OBSTETRICS & GYNECOLOGY

## 2024-02-29 PROCEDURE — C1755 CATHETER, INTRASPINAL: HCPCS | Performed by: OBSTETRICS & GYNECOLOGY

## 2024-02-29 PROCEDURE — 160029 HCHG SURGERY MINUTES - 1ST 30 MINS LEVEL 4: Performed by: OBSTETRICS & GYNECOLOGY

## 2024-02-29 PROCEDURE — 700111 HCHG RX REV CODE 636 W/ 250 OVERRIDE (IP): Performed by: OBSTETRICS & GYNECOLOGY

## 2024-02-29 PROCEDURE — 700102 HCHG RX REV CODE 250 W/ 637 OVERRIDE(OP): Performed by: SPECIALIST

## 2024-02-29 PROCEDURE — 700111 HCHG RX REV CODE 636 W/ 250 OVERRIDE (IP): Performed by: SPECIALIST

## 2024-02-29 PROCEDURE — 700111 HCHG RX REV CODE 636 W/ 250 OVERRIDE (IP): Performed by: ANESTHESIOLOGY

## 2024-02-29 PROCEDURE — 700111 HCHG RX REV CODE 636 W/ 250 OVERRIDE (IP): Mod: JZ | Performed by: ANESTHESIOLOGY

## 2024-02-29 PROCEDURE — A9270 NON-COVERED ITEM OR SERVICE: HCPCS | Performed by: ANESTHESIOLOGY

## 2024-02-29 RX ORDER — DIPHENHYDRAMINE HYDROCHLORIDE 50 MG/ML
25 INJECTION INTRAMUSCULAR; INTRAVENOUS EVERY 6 HOURS PRN
Status: ACTIVE | OUTPATIENT
Start: 2024-02-29 | End: 2024-03-01

## 2024-02-29 RX ORDER — HYDROMORPHONE HYDROCHLORIDE 1 MG/ML
0.2 INJECTION, SOLUTION INTRAMUSCULAR; INTRAVENOUS; SUBCUTANEOUS
Status: CANCELLED | OUTPATIENT
Start: 2024-02-29

## 2024-02-29 RX ORDER — KETOROLAC TROMETHAMINE 30 MG/ML
INJECTION, SOLUTION INTRAMUSCULAR; INTRAVENOUS PRN
Status: DISCONTINUED | OUTPATIENT
Start: 2024-02-29 | End: 2024-02-29 | Stop reason: SURG

## 2024-02-29 RX ORDER — OXYCODONE HYDROCHLORIDE 5 MG/1
5 TABLET ORAL EVERY 4 HOURS PRN
Status: DISCONTINUED | OUTPATIENT
Start: 2024-03-01 | End: 2024-03-03 | Stop reason: HOSPADM

## 2024-02-29 RX ORDER — ONDANSETRON 2 MG/ML
INJECTION INTRAMUSCULAR; INTRAVENOUS PRN
Status: DISCONTINUED | OUTPATIENT
Start: 2024-02-29 | End: 2024-02-29 | Stop reason: SURG

## 2024-02-29 RX ORDER — METOCLOPRAMIDE HYDROCHLORIDE 5 MG/ML
10 INJECTION INTRAMUSCULAR; INTRAVENOUS ONCE
Status: COMPLETED | OUTPATIENT
Start: 2024-02-29 | End: 2024-02-29

## 2024-02-29 RX ORDER — HYDROMORPHONE HYDROCHLORIDE 1 MG/ML
0.2 INJECTION, SOLUTION INTRAMUSCULAR; INTRAVENOUS; SUBCUTANEOUS
Status: ACTIVE | OUTPATIENT
Start: 2024-02-29 | End: 2024-03-01

## 2024-02-29 RX ORDER — KETOROLAC TROMETHAMINE 15 MG/ML
15 INJECTION, SOLUTION INTRAMUSCULAR; INTRAVENOUS EVERY 6 HOURS
Status: COMPLETED | OUTPATIENT
Start: 2024-02-29 | End: 2024-03-01

## 2024-02-29 RX ORDER — LIDOCAINE HCL/EPINEPHRINE/PF 2%-1:200K
VIAL (ML) INJECTION PRN
Status: DISCONTINUED | OUTPATIENT
Start: 2024-02-29 | End: 2024-02-29 | Stop reason: SURG

## 2024-02-29 RX ORDER — DEXAMETHASONE SODIUM PHOSPHATE 4 MG/ML
INJECTION, SOLUTION INTRA-ARTICULAR; INTRALESIONAL; INTRAMUSCULAR; INTRAVENOUS; SOFT TISSUE PRN
Status: DISCONTINUED | OUTPATIENT
Start: 2024-02-29 | End: 2024-02-29 | Stop reason: SURG

## 2024-02-29 RX ORDER — ACETAMINOPHEN 500 MG
1000 TABLET ORAL EVERY 6 HOURS
Qty: 24 TABLET | Refills: 0 | Status: DISCONTINUED | OUTPATIENT
Start: 2024-03-01 | End: 2024-03-03 | Stop reason: HOSPADM

## 2024-02-29 RX ORDER — OXYCODONE HYDROCHLORIDE 5 MG/1
5 TABLET ORAL EVERY 4 HOURS PRN
Status: DISPENSED | OUTPATIENT
Start: 2024-02-29 | End: 2024-03-01

## 2024-02-29 RX ORDER — CLINDAMYCIN PHOSPHATE 900 MG/50ML
900 INJECTION, SOLUTION INTRAVENOUS EVERY 8 HOURS
Qty: 150 ML | Refills: 0 | Status: COMPLETED | OUTPATIENT
Start: 2024-02-29 | End: 2024-02-29

## 2024-02-29 RX ORDER — ONDANSETRON 2 MG/ML
4 INJECTION INTRAMUSCULAR; INTRAVENOUS
Status: CANCELLED | OUTPATIENT
Start: 2024-02-29

## 2024-02-29 RX ORDER — SODIUM CHLORIDE, SODIUM LACTATE, POTASSIUM CHLORIDE, CALCIUM CHLORIDE 600; 310; 30; 20 MG/100ML; MG/100ML; MG/100ML; MG/100ML
INJECTION, SOLUTION INTRAVENOUS PRN
Status: DISCONTINUED | OUTPATIENT
Start: 2024-02-29 | End: 2024-03-03 | Stop reason: HOSPADM

## 2024-02-29 RX ORDER — MORPHINE SULFATE 0.5 MG/ML
INJECTION, SOLUTION EPIDURAL; INTRATHECAL; INTRAVENOUS PRN
Status: DISCONTINUED | OUTPATIENT
Start: 2024-02-29 | End: 2024-02-29 | Stop reason: SURG

## 2024-02-29 RX ORDER — HYDROMORPHONE HYDROCHLORIDE 1 MG/ML
0.4 INJECTION, SOLUTION INTRAMUSCULAR; INTRAVENOUS; SUBCUTANEOUS
Status: ACTIVE | OUTPATIENT
Start: 2024-02-29 | End: 2024-03-01

## 2024-02-29 RX ORDER — CITRIC ACID/SODIUM CITRATE 334-500MG
30 SOLUTION, ORAL ORAL ONCE
Status: COMPLETED | OUTPATIENT
Start: 2024-02-29 | End: 2024-02-29

## 2024-02-29 RX ORDER — DIPHENHYDRAMINE HYDROCHLORIDE 50 MG/ML
12.5 INJECTION INTRAMUSCULAR; INTRAVENOUS EVERY 6 HOURS PRN
Status: ACTIVE | OUTPATIENT
Start: 2024-02-29 | End: 2024-03-01

## 2024-02-29 RX ORDER — ONDANSETRON 2 MG/ML
4 INJECTION INTRAMUSCULAR; INTRAVENOUS EVERY 6 HOURS PRN
Status: ACTIVE | OUTPATIENT
Start: 2024-02-29 | End: 2024-03-01

## 2024-02-29 RX ORDER — SODIUM CHLORIDE, SODIUM GLUCONATE, SODIUM ACETATE, POTASSIUM CHLORIDE AND MAGNESIUM CHLORIDE 526; 502; 368; 37; 30 MG/100ML; MG/100ML; MG/100ML; MG/100ML; MG/100ML
1000 INJECTION, SOLUTION INTRAVENOUS ONCE
Status: COMPLETED | OUTPATIENT
Start: 2024-02-29 | End: 2024-02-29

## 2024-02-29 RX ORDER — CEFAZOLIN SODIUM 1 G/3ML
INJECTION, POWDER, FOR SOLUTION INTRAMUSCULAR; INTRAVENOUS PRN
Status: DISCONTINUED | OUTPATIENT
Start: 2024-02-29 | End: 2024-02-29 | Stop reason: SURG

## 2024-02-29 RX ORDER — ACETAMINOPHEN 500 MG
1000 TABLET ORAL EVERY 6 HOURS PRN
Status: DISCONTINUED | OUTPATIENT
Start: 2024-03-04 | End: 2024-03-03 | Stop reason: HOSPADM

## 2024-02-29 RX ORDER — DOCUSATE SODIUM 100 MG/1
100 CAPSULE, LIQUID FILLED ORAL 2 TIMES DAILY PRN
Status: DISCONTINUED | OUTPATIENT
Start: 2024-02-29 | End: 2024-03-03 | Stop reason: HOSPADM

## 2024-02-29 RX ORDER — EPHEDRINE SULFATE 50 MG/ML
5 INJECTION, SOLUTION INTRAVENOUS
Status: CANCELLED | OUTPATIENT
Start: 2024-02-29

## 2024-02-29 RX ORDER — SODIUM CHLORIDE, SODIUM GLUCONATE, SODIUM ACETATE, POTASSIUM CHLORIDE AND MAGNESIUM CHLORIDE 526; 502; 368; 37; 30 MG/100ML; MG/100ML; MG/100ML; MG/100ML; MG/100ML
INJECTION, SOLUTION INTRAVENOUS
Status: DISCONTINUED | OUTPATIENT
Start: 2024-02-29 | End: 2024-02-29 | Stop reason: SURG

## 2024-02-29 RX ORDER — SODIUM CHLORIDE, SODIUM GLUCONATE, SODIUM ACETATE, POTASSIUM CHLORIDE AND MAGNESIUM CHLORIDE 526; 502; 368; 37; 30 MG/100ML; MG/100ML; MG/100ML; MG/100ML; MG/100ML
500 INJECTION, SOLUTION INTRAVENOUS CONTINUOUS
Status: CANCELLED | OUTPATIENT
Start: 2024-02-29

## 2024-02-29 RX ORDER — OXYCODONE HYDROCHLORIDE 10 MG/1
10 TABLET ORAL EVERY 4 HOURS PRN
Status: DISCONTINUED | OUTPATIENT
Start: 2024-03-01 | End: 2024-03-03 | Stop reason: HOSPADM

## 2024-02-29 RX ORDER — IBUPROFEN 800 MG/1
800 TABLET ORAL EVERY 8 HOURS PRN
Status: DISCONTINUED | OUTPATIENT
Start: 2024-03-04 | End: 2024-03-03 | Stop reason: HOSPADM

## 2024-02-29 RX ORDER — IBUPROFEN 800 MG/1
800 TABLET ORAL EVERY 8 HOURS
Qty: 9 TABLET | Refills: 0 | Status: DISCONTINUED | OUTPATIENT
Start: 2024-03-01 | End: 2024-03-03 | Stop reason: HOSPADM

## 2024-02-29 RX ORDER — EPHEDRINE SULFATE 50 MG/ML
10 INJECTION, SOLUTION INTRAVENOUS
Status: ACTIVE | OUTPATIENT
Start: 2024-02-29 | End: 2024-03-01

## 2024-02-29 RX ORDER — HYDROMORPHONE HYDROCHLORIDE 1 MG/ML
0.4 INJECTION, SOLUTION INTRAMUSCULAR; INTRAVENOUS; SUBCUTANEOUS
Status: CANCELLED | OUTPATIENT
Start: 2024-02-29

## 2024-02-29 RX ORDER — HYDROMORPHONE HYDROCHLORIDE 1 MG/ML
0.5 INJECTION, SOLUTION INTRAMUSCULAR; INTRAVENOUS; SUBCUTANEOUS
Status: CANCELLED | OUTPATIENT
Start: 2024-02-29

## 2024-02-29 RX ORDER — AZITHROMYCIN 500 MG/5ML
500 INJECTION, POWDER, LYOPHILIZED, FOR SOLUTION INTRAVENOUS ONCE
Status: COMPLETED | OUTPATIENT
Start: 2024-02-29 | End: 2024-02-29

## 2024-02-29 RX ORDER — HYDRALAZINE HYDROCHLORIDE 20 MG/ML
5 INJECTION INTRAMUSCULAR; INTRAVENOUS
Status: CANCELLED | OUTPATIENT
Start: 2024-02-29

## 2024-02-29 RX ORDER — BISACODYL 10 MG
10 SUPPOSITORY, RECTAL RECTAL PRN
Status: DISCONTINUED | OUTPATIENT
Start: 2024-02-29 | End: 2024-03-03 | Stop reason: HOSPADM

## 2024-02-29 RX ORDER — ACETAMINOPHEN 500 MG
1000 TABLET ORAL EVERY 6 HOURS
Status: COMPLETED | OUTPATIENT
Start: 2024-02-29 | End: 2024-03-01

## 2024-02-29 RX ORDER — ONDANSETRON 4 MG/1
4 TABLET, ORALLY DISINTEGRATING ORAL EVERY 6 HOURS PRN
Status: DISCONTINUED | OUTPATIENT
Start: 2024-03-01 | End: 2024-03-03 | Stop reason: HOSPADM

## 2024-02-29 RX ORDER — LABETALOL HYDROCHLORIDE 5 MG/ML
5 INJECTION, SOLUTION INTRAVENOUS
Status: CANCELLED | OUTPATIENT
Start: 2024-02-29

## 2024-02-29 RX ORDER — CITRIC ACID/SODIUM CITRATE 334-500MG
SOLUTION, ORAL ORAL
Status: ACTIVE
Start: 2024-02-29 | End: 2024-02-29

## 2024-02-29 RX ORDER — OXYCODONE HYDROCHLORIDE 10 MG/1
10 TABLET ORAL EVERY 4 HOURS PRN
Status: ACTIVE | OUTPATIENT
Start: 2024-02-29 | End: 2024-03-01

## 2024-02-29 RX ORDER — DIPHENHYDRAMINE HYDROCHLORIDE 50 MG/ML
12.5 INJECTION INTRAMUSCULAR; INTRAVENOUS
Status: CANCELLED | OUTPATIENT
Start: 2024-02-29

## 2024-02-29 RX ORDER — KETOROLAC TROMETHAMINE 15 MG/ML
15 INJECTION, SOLUTION INTRAMUSCULAR; INTRAVENOUS EVERY 6 HOURS
Status: DISCONTINUED | OUTPATIENT
Start: 2024-02-29 | End: 2024-02-29

## 2024-02-29 RX ORDER — MEPERIDINE HYDROCHLORIDE 25 MG/ML
12.5 INJECTION INTRAMUSCULAR; INTRAVENOUS; SUBCUTANEOUS
Status: CANCELLED | OUTPATIENT
Start: 2024-02-29

## 2024-02-29 RX ORDER — DIPHENHYDRAMINE HCL 25 MG
25 TABLET ORAL EVERY 6 HOURS PRN
Status: DISCONTINUED | OUTPATIENT
Start: 2024-03-01 | End: 2024-03-03 | Stop reason: HOSPADM

## 2024-02-29 RX ORDER — DIPHENHYDRAMINE HYDROCHLORIDE 50 MG/ML
25 INJECTION INTRAMUSCULAR; INTRAVENOUS EVERY 6 HOURS PRN
Status: DISCONTINUED | OUTPATIENT
Start: 2024-03-01 | End: 2024-03-03 | Stop reason: HOSPADM

## 2024-02-29 RX ORDER — ONDANSETRON 2 MG/ML
4 INJECTION INTRAMUSCULAR; INTRAVENOUS EVERY 6 HOURS PRN
Status: DISCONTINUED | OUTPATIENT
Start: 2024-03-01 | End: 2024-03-03 | Stop reason: HOSPADM

## 2024-02-29 RX ADMIN — CEFAZOLIN 2 G: 1 INJECTION, POWDER, FOR SOLUTION INTRAMUSCULAR; INTRAVENOUS at 00:44

## 2024-02-29 RX ADMIN — FENTANYL CITRATE 100 MCG: 50 INJECTION, SOLUTION INTRAMUSCULAR; INTRAVENOUS at 00:41

## 2024-02-29 RX ADMIN — OXYCODONE 5 MG: 5 TABLET ORAL at 12:21

## 2024-02-29 RX ADMIN — KETOROLAC TROMETHAMINE 15 MG: 15 INJECTION, SOLUTION INTRAMUSCULAR; INTRAVENOUS at 07:56

## 2024-02-29 RX ADMIN — ACETAMINOPHEN 1000 MG: 500 TABLET ORAL at 21:01

## 2024-02-29 RX ADMIN — LIDOCAINE HYDROCHLORIDE,EPINEPHRINE BITARTRATE 5 ML: 20; .005 INJECTION, SOLUTION EPIDURAL; INFILTRATION; INTRACAUDAL; PERINEURAL at 00:42

## 2024-02-29 RX ADMIN — SODIUM CITRATE AND CITRIC ACID MONOHYDRATE 30 ML: 334; 500 SOLUTION ORAL at 00:22

## 2024-02-29 RX ADMIN — LIDOCAINE HYDROCHLORIDE,EPINEPHRINE BITARTRATE 5 ML: 20; .005 INJECTION, SOLUTION EPIDURAL; INFILTRATION; INTRACAUDAL; PERINEURAL at 00:26

## 2024-02-29 RX ADMIN — AZITHROMYCIN 500 MG: 500 INJECTION, POWDER, LYOPHILIZED, FOR SOLUTION INTRAVENOUS at 00:23

## 2024-02-29 RX ADMIN — OXYTOCIN 125 ML/HR: 10 INJECTION, SOLUTION INTRAMUSCULAR; INTRAVENOUS at 02:16

## 2024-02-29 RX ADMIN — GENTAMICIN SULFATE 360 MG: 40 INJECTION, SOLUTION INTRAMUSCULAR; INTRAVENOUS at 21:20

## 2024-02-29 RX ADMIN — ACETAMINOPHEN 1000 MG: 500 TABLET ORAL at 02:34

## 2024-02-29 RX ADMIN — MORPHINE SULFATE 2.5 MG: 0.5 INJECTION, SOLUTION EPIDURAL; INTRATHECAL; INTRAVENOUS at 01:10

## 2024-02-29 RX ADMIN — AMPICILLIN SODIUM 1 G: 1 INJECTION, POWDER, FOR SOLUTION INTRAMUSCULAR; INTRAVENOUS at 22:53

## 2024-02-29 RX ADMIN — AMPICILLIN SODIUM 1 G: 1 INJECTION, POWDER, FOR SOLUTION INTRAMUSCULAR; INTRAVENOUS at 04:59

## 2024-02-29 RX ADMIN — SODIUM CHLORIDE, SODIUM GLUCONATE, SODIUM ACETATE, POTASSIUM CHLORIDE AND MAGNESIUM CHLORIDE: 526; 502; 368; 37; 30 INJECTION, SOLUTION INTRAVENOUS at 00:36

## 2024-02-29 RX ADMIN — LIDOCAINE HYDROCHLORIDE,EPINEPHRINE BITARTRATE 5 ML: 20; .005 INJECTION, SOLUTION EPIDURAL; INFILTRATION; INTRACAUDAL; PERINEURAL at 00:36

## 2024-02-29 RX ADMIN — KETOROLAC TROMETHAMINE 15 MG: 15 INJECTION, SOLUTION INTRAMUSCULAR; INTRAVENOUS at 21:01

## 2024-02-29 RX ADMIN — METOCLOPRAMIDE 10 MG: 5 INJECTION, SOLUTION INTRAMUSCULAR; INTRAVENOUS at 00:30

## 2024-02-29 RX ADMIN — OXYTOCIN 1000 ML: 10 INJECTION, SOLUTION INTRAMUSCULAR; INTRAVENOUS at 00:54

## 2024-02-29 RX ADMIN — ACETAMINOPHEN 1000 MG: 500 TABLET ORAL at 07:55

## 2024-02-29 RX ADMIN — ACETAMINOPHEN 1000 MG: 500 TABLET ORAL at 14:31

## 2024-02-29 RX ADMIN — CLINDAMYCIN IN 5 PERCENT DEXTROSE 900 MG: 18 INJECTION, SOLUTION INTRAVENOUS at 17:57

## 2024-02-29 RX ADMIN — ONDANSETRON 4 MG: 2 INJECTION INTRAMUSCULAR; INTRAVENOUS at 00:44

## 2024-02-29 RX ADMIN — FAMOTIDINE 20 MG: 10 INJECTION, SOLUTION INTRAVENOUS at 00:22

## 2024-02-29 RX ADMIN — CLINDAMYCIN IN 5 PERCENT DEXTROSE 900 MG: 18 INJECTION, SOLUTION INTRAVENOUS at 11:18

## 2024-02-29 RX ADMIN — SODIUM CHLORIDE, SODIUM GLUCONATE, SODIUM ACETATE, POTASSIUM CHLORIDE AND MAGNESIUM CHLORIDE 1000 ML: 526; 502; 368; 37; 30 INJECTION, SOLUTION INTRAVENOUS at 00:45

## 2024-02-29 RX ADMIN — CLINDAMYCIN IN 5 PERCENT DEXTROSE 900 MG: 18 INJECTION, SOLUTION INTRAVENOUS at 02:22

## 2024-02-29 RX ADMIN — DEXAMETHASONE SODIUM PHOSPHATE 8 MG: 4 INJECTION INTRA-ARTICULAR; INTRALESIONAL; INTRAMUSCULAR; INTRAVENOUS; SOFT TISSUE at 00:44

## 2024-02-29 RX ADMIN — KETOROLAC TROMETHAMINE 15 MG: 30 INJECTION, SOLUTION INTRAMUSCULAR; INTRAVENOUS at 01:10

## 2024-02-29 RX ADMIN — AMPICILLIN SODIUM 1 G: 1 INJECTION, POWDER, FOR SOLUTION INTRAMUSCULAR; INTRAVENOUS at 14:47

## 2024-02-29 RX ADMIN — KETOROLAC TROMETHAMINE 15 MG: 15 INJECTION, SOLUTION INTRAMUSCULAR; INTRAVENOUS at 14:30

## 2024-02-29 ASSESSMENT — PAIN DESCRIPTION - PAIN TYPE
TYPE: ACUTE PAIN;SURGICAL PAIN
TYPE: ACUTE PAIN
TYPE: ACUTE PAIN;SURGICAL PAIN

## 2024-02-29 NOTE — PROGRESS NOTES
L&D Progress Note    Name:   Esther Bee   Date/Time:  2/28/2024 4:48 PM  Gestational Age:  39w0d  Admit Date:   2/28/2024  Admitting Dx:   Labor and delivery indication for care or intervention [O75.9]    Subjective:  Comfortable on epidural    Objective:   Vitals:    02/28/24 1236 02/28/24 1320 02/28/24 1326 02/28/24 1330   BP: (!) 141/80 138/72 135/66 125/63   Pulse: 71 (!) 107 78 88   Resp:       Temp:       TempSrc:       SpO2:  96%     Weight:       Height:         Cat I  [place] q 2-3  Cervix 5/100/0  AROM clear   Meds:   Epidural : .  yes  Magnesium sulfate: . N\A  Pitocin: .  yes    Labs:  Recent Results (from the past 72 hour(s))   Hold Blood Bank Specimen (Not Tested)    Collection Time: 02/28/24  5:30 AM   Result Value Ref Range    Holding Tube - Bb DONE    CBC with differential    Collection Time: 02/28/24  5:30 AM   Result Value Ref Range    WBC 12.3 (H) 4.8 - 10.8 K/uL    RBC 5.12 4.20 - 5.40 M/uL    Hemoglobin 14.4 12.0 - 16.0 g/dL    Hematocrit 42.4 37.0 - 47.0 %    MCV 82.8 81.4 - 97.8 fL    MCH 28.1 27.0 - 33.0 pg    MCHC 34.0 32.2 - 35.5 g/dL    RDW 39.7 35.9 - 50.0 fL    Platelet Count 258 164 - 446 K/uL    MPV 9.8 9.0 - 12.9 fL    Neutrophils-Polys 73.80 (H) 44.00 - 72.00 %    Lymphocytes 20.50 (L) 22.00 - 41.00 %    Monocytes 4.60 0.00 - 13.40 %    Eosinophils 0.20 0.00 - 6.90 %    Basophils 0.20 0.00 - 1.80 %    Immature Granulocytes 0.70 0.00 - 0.90 %    Nucleated RBC 0.00 0.00 - 0.20 /100 WBC    Neutrophils (Absolute) 9.05 (H) 1.82 - 7.42 K/uL    Lymphs (Absolute) 2.52 1.00 - 4.80 K/uL    Monos (Absolute) 0.57 0.00 - 0.85 K/uL    Eos (Absolute) 0.03 0.00 - 0.51 K/uL    Baso (Absolute) 0.02 0.00 - 0.12 K/uL    Immature Granulocytes (abs) 0.08 0.00 - 0.11 K/uL    NRBC (Absolute) 0.00 K/uL   T.PALLIDUM AB KEVON (Syphilis)    Collection Time: 02/28/24  5:30 AM   Result Value Ref Range    Syphilis, Treponemal Qual Non-Reactive Non-Reactive   Comp Metabolic Panel    Collection  Time: 24  5:30 AM   Result Value Ref Range    Sodium 138 135 - 145 mmol/L    Potassium 4.2 3.6 - 5.5 mmol/L    Chloride 106 96 - 112 mmol/L    Co2 17 (L) 20 - 33 mmol/L    Anion Gap 15.0 7.0 - 16.0    Glucose 85 65 - 99 mg/dL    Bun 9 8 - 22 mg/dL    Creatinine 0.37 (L) 0.50 - 1.40 mg/dL    Calcium 9.2 8.5 - 10.5 mg/dL    Correct Calcium 9.6 8.5 - 10.5 mg/dL    AST(SGOT) 20 12 - 45 U/L    ALT(SGPT) 14 2 - 50 U/L    Alkaline Phosphatase 209 (H) 30 - 99 U/L    Total Bilirubin 0.2 0.1 - 1.5 mg/dL    Albumin 3.5 3.2 - 4.9 g/dL    Total Protein 7.3 6.0 - 8.2 g/dL    Globulin 3.8 (H) 1.9 - 3.5 g/dL    A-G Ratio 0.9 g/dL   ESTIMATED GFR    Collection Time: 24  5:30 AM   Result Value Ref Range    GFR (CKD-EPI) 146 >60 mL/min/1.73 m 2   PROTEIN/CREAT RATIO URINE    Collection Time: 24  5:52 AM   Result Value Ref Range    Total Protein, Urine 30.0 (H) 0.0 - 15.0 mg/dL    Creatinine, Random Urine 42.39 mg/dL    Protein Creatinine Ratio 708 (H) 10 - 107 mg/g         Assessment:   Gestational Age:   39w0d  Titrate Pitocin   Anticipate   Patient Active Problem List    Diagnosis Date Noted    Labor and delivery indication for care or intervention 2024    Chronic SI joint pain 2020    Overweight, pediatric, BMI (body mass index) 95-99% for age 2018    Obesity, pediatric, BMI 95th to 98th percentile for age 07/15/2016    High triglycerides 07/15/2016    Vitamin D deficiency 07/15/2016    Hyperinsulinemia 07/15/2016    Right-sided low back pain without sciatica 07/15/2016       Gisselle Calderón M.D.

## 2024-02-29 NOTE — OP REPORT
DATE OF PROCEDURE:  2024    PRE-OP DIAGNOSIS: Term IUP 39 weeks; active labor                                       Failure to Progress, Arrest in Descent ; CPD        Chorioamnionitis  POST-OP DIAGNOSIS: same     PROCEDURE: PRIMARY LOW TRANSVERSE  SECTION PFANNENSTIEL     SURGEONS: CHRISTY BINGHAM MD/MJ MAYO MD                                ANESTHESIOLOGIST: SHAISTA BREAUX MD/EPIDURAL     SPECIMEN: cord gas, placenta      EBL: 500 CC                                                               UO: CLEAR     FINDINGS:delivered to live born baby boy (Loc) vertex                     BW - 3440 gm (7lb9.3oz)        AS                     Placenta delivered complete and intact ; 3VC                    Thick meconium stained AF                     Uterus- thin lower uterine segment, bilateral tubes and ovaries grossly normal                DESCRIPTION OF PROCEDURE:                            Patient and family were discussed about the risks, indications, alternatives and complications of the procedure. No further questions. Signed consents.              Patient was taken to the operating room where spinal anesthesia induction was done without difficulty. She was then placed in a supine position with a leftward tilt, prepped and draped in a normal usual sterile fashion. After adequate level of anesthesia has been ascertained, Pfannenstiel incision was then made on the skin and carried down to the underlying layer of fascia. Fascia was nicked in the midline and incision carried down bilaterally sharply. Superior and inferior aspects of fascial incision was clamped with Kocher's, elevated, tented up and dissected off bluntly from underlying rectus muscle. Rectus muscle was then  in the midline and peritoneum entered sharply and extended cephalo-caudally. Good visualization of the lower uterine segment afforded. Ezra O was inserted with identification of the  vesico-uterine peritoneum and creation of bladder flap to put down the bladder.   Low transverse incision was then made at the lower uterine segment and extended cephalocaudally digitally. Infant was delivered in vertex presentation, good suctioning of the nose and mouth was done and rest of the body delivered atraumatically. Delayed cord clamping was done and cut and handed off to the RT staff in attendance for further care.   Placenta was delivered complete and intact, 3 VC.  The cavity was cleansed of all clots and debris.   Hysterotomy incision was repaired using Chromic 1 in a continuous interlocking fashion with excellent hemostasis.   Pelvic gutters was cleared of clots and debris.   Peritoneal layer was closed in simple continuous manner using Vicryl 2-0.  Rectus muscle was reapproximated in the midline using chromic 1.  Fascial layer was repaired using Vicryl 0 in the simple continuous fashion.  Subcutaneous layer was reapproximated using Vicryl 3-0.  Skin was closed with Insorb staples.   Prineo was applied over the incision.   All layers were hemostatic.  Procedure was terminated. Sponges, Laps and needles count correct x 3.  Patient was taken to the PACU, awake and in good condition.      Gisselle Pitts MD

## 2024-02-29 NOTE — LACTATION NOTE
Attempt to visit. Patient sleeping.  F/U attempt @ 1230: MOB in NICU. Pump has been set up at bedside.

## 2024-02-29 NOTE — PROGRESS NOTES
L&D Progress Note    Name:   Esther Bee   Date/Time:  2/29/2024 1:25 AM  Gestational Age:  39w1d  Admit Date:   2/28/2024  Admitting Dx:   Labor and delivery indication for care or intervention [O75.9]    Subjective:  uncomfortable  (+) fever  Objective:   Vitals:    02/28/24 2147 02/28/24 2206 02/28/24 2326 02/29/24 0024   BP: (!) 145/70 (!) 140/73 138/70 (!) 143/84   Pulse: 84 87 94 100   Resp:       Temp:       TempSrc:       SpO2:       Weight:       Height:         Cat I-II Tachycardic; minimal to moderate variability  [place] q 2-3  Cervix complete st +1 Ascynclitic    Meds:   Epidural : .  yes  Magnesium sulfate: . N\A  Pitocin: .  yes    Labs:  Recent Results (from the past 72 hour(s))   Hold Blood Bank Specimen (Not Tested)    Collection Time: 02/28/24  5:30 AM   Result Value Ref Range    Holding Tube - Bb DONE    CBC with differential    Collection Time: 02/28/24  5:30 AM   Result Value Ref Range    WBC 12.3 (H) 4.8 - 10.8 K/uL    RBC 5.12 4.20 - 5.40 M/uL    Hemoglobin 14.4 12.0 - 16.0 g/dL    Hematocrit 42.4 37.0 - 47.0 %    MCV 82.8 81.4 - 97.8 fL    MCH 28.1 27.0 - 33.0 pg    MCHC 34.0 32.2 - 35.5 g/dL    RDW 39.7 35.9 - 50.0 fL    Platelet Count 258 164 - 446 K/uL    MPV 9.8 9.0 - 12.9 fL    Neutrophils-Polys 73.80 (H) 44.00 - 72.00 %    Lymphocytes 20.50 (L) 22.00 - 41.00 %    Monocytes 4.60 0.00 - 13.40 %    Eosinophils 0.20 0.00 - 6.90 %    Basophils 0.20 0.00 - 1.80 %    Immature Granulocytes 0.70 0.00 - 0.90 %    Nucleated RBC 0.00 0.00 - 0.20 /100 WBC    Neutrophils (Absolute) 9.05 (H) 1.82 - 7.42 K/uL    Lymphs (Absolute) 2.52 1.00 - 4.80 K/uL    Monos (Absolute) 0.57 0.00 - 0.85 K/uL    Eos (Absolute) 0.03 0.00 - 0.51 K/uL    Baso (Absolute) 0.02 0.00 - 0.12 K/uL    Immature Granulocytes (abs) 0.08 0.00 - 0.11 K/uL    NRBC (Absolute) 0.00 K/uL   T.PALLIDUM AB KEVON (Syphilis)    Collection Time: 02/28/24  5:30 AM   Result Value Ref Range    Syphilis, Treponemal Qual  Non-Reactive Non-Reactive   Comp Metabolic Panel    Collection Time: 24  5:30 AM   Result Value Ref Range    Sodium 138 135 - 145 mmol/L    Potassium 4.2 3.6 - 5.5 mmol/L    Chloride 106 96 - 112 mmol/L    Co2 17 (L) 20 - 33 mmol/L    Anion Gap 15.0 7.0 - 16.0    Glucose 85 65 - 99 mg/dL    Bun 9 8 - 22 mg/dL    Creatinine 0.37 (L) 0.50 - 1.40 mg/dL    Calcium 9.2 8.5 - 10.5 mg/dL    Correct Calcium 9.6 8.5 - 10.5 mg/dL    AST(SGOT) 20 12 - 45 U/L    ALT(SGPT) 14 2 - 50 U/L    Alkaline Phosphatase 209 (H) 30 - 99 U/L    Total Bilirubin 0.2 0.1 - 1.5 mg/dL    Albumin 3.5 3.2 - 4.9 g/dL    Total Protein 7.3 6.0 - 8.2 g/dL    Globulin 3.8 (H) 1.9 - 3.5 g/dL    A-G Ratio 0.9 g/dL   ESTIMATED GFR    Collection Time: 24  5:30 AM   Result Value Ref Range    GFR (CKD-EPI) 146 >60 mL/min/1.73 m 2   PROTEIN/CREAT RATIO URINE    Collection Time: 24  5:52 AM   Result Value Ref Range    Total Protein, Urine 30.0 (H) 0.0 - 15.0 mg/dL    Creatinine, Random Urine 42.39 mg/dL    Protein Creatinine Ratio 708 (H) 10 - 107 mg/g     Assessment:   Gestational Age:   39w1d  Failure to progress  Arrest in Descent    Discussed with the patient indications for  delivery. The patient voiced understanding of indications for  section at this time.    Discussed with the patient the risks of  delivery. The risks include infection, bleeding, scarring, damage to other organs in the area of operation. Specifically organs that can be damaged are bowel, bladder, ureters. Injury to the baby during the delivery process although uncommon.  I also discussed with the patient the risk of wound infection and wound breakdown. We discussed that these risks are greater in people that have diabetes or obesity. I also discussed the risk of emergency blood transfusion during procedure as well as emergency hysterectomy during procedure.    Patient had the opportunity to ask questions regarding procedures. All questions  answered to the patient's satisfaction.  Proceed with  delivery.    Patient Active Problem List    Diagnosis Date Noted    Labor and delivery indication for care or intervention 2024    Chronic SI joint pain 2020    Overweight, pediatric, BMI (body mass index) 95-99% for age 2018    Obesity, pediatric, BMI 95th to 98th percentile for age 07/15/2016    High triglycerides 07/15/2016    Vitamin D deficiency 07/15/2016    Hyperinsulinemia 07/15/2016    Right-sided low back pain without sciatica 07/15/2016       Gisselle Calderón M.D.

## 2024-02-29 NOTE — DISCHARGE PLANNING
Discharge Planning Assessment Post Partum    Reason for Referral: NICU admission  Address: [address] [place] [address] [place] [address]   Type of Living Situation:Stable  Mom Diagnosis: Pregnancy  Baby Diagnosis: NICU admission 39weeks  Primary Language: English     Name of Baby: Loc Metz   Father of the Baby: Stefan Medina   Involved in baby’s care? Yes, at bedside   Contact Information: [phone]    Prenatal Care: Dr. Rodriguez  Mom's PCP: None  PCP for new baby:Dr. Hansen w/ [facility]    Support System: FOB, family, friends  Coping/Bonding between mother & baby: coping and bonding well.  Source of Feeding: Breast  Supplies for Infant: yes    Mom's Insurance: [place]  Baby Covered on Insurance:yes  Mother Employed/School: N/A  Other children in the home/names & ages: First baby    Financial Hardship/Income: None  Mom's Mental status: Stable and alert   Services used prior to admit: WIC     CPS History: Denies   Psychiatric History: Denies  Domestic Violence History: Denies   Drug/ETOH History: Denies    Resources Provided: None  Referrals Made: None     Clearance for Discharge: Infant is cleared to discharge with parents upon medical clearance.     Ongoing Plan:  will continue to follow and assist with discharge planning as needed.

## 2024-02-29 NOTE — PROGRESS NOTES
0343: Patient to room 332 via [facility]. Report received from DONG Ovalle. IV patent running pitocin at 125 cc/hr. Patient on pulse ox and SCD's on and working. Delong draining to gravity. Patient oriented to unit and room. Call light and emergency call light use discussed. Whiteboards updated, POC discussed. Patient encouraged to call with any needs and or concerns.

## 2024-02-29 NOTE — ANESTHESIA TIME REPORT
Anesthesia Start and Stop Event Times       Date Time Event    2/28/2024 1233 Ready for Procedure     1234 Anesthesia Start    2/29/2024 0130 Anesthesia Stop          Responsible Staff  02/28/24 to 02/29/24      Name Role Begin End    Kaleigh Monique M.D. Anesth 1234 0130          Overtime Reason:  no overtime (within assigned shift)    Comments:

## 2024-02-29 NOTE — PROGRESS NOTES
0130: Bedside report received from DONG Schreiber. POC discussed.    0145: Orders for clindamycin, ampicillin, and gentamicin for 24 hours post delivery. See MAR.    0350: Delong catheter bag emptied. 55 cc blood on pad. Pt transported via wheelchair with arm rails up. Pt has sustained elevated pressures. Nursing communication entered, see orders. Fundus firm at umbilicus, light rubra. Bedside report given to DONG Ross.

## 2024-03-01 PROCEDURE — 700102 HCHG RX REV CODE 250 W/ 637 OVERRIDE(OP): Performed by: ANESTHESIOLOGY

## 2024-03-01 PROCEDURE — 700111 HCHG RX REV CODE 636 W/ 250 OVERRIDE (IP): Performed by: OBSTETRICS & GYNECOLOGY

## 2024-03-01 PROCEDURE — 700102 HCHG RX REV CODE 250 W/ 637 OVERRIDE(OP): Performed by: OBSTETRICS & GYNECOLOGY

## 2024-03-01 PROCEDURE — 770002 HCHG ROOM/CARE - OB PRIVATE (112)

## 2024-03-01 PROCEDURE — A9270 NON-COVERED ITEM OR SERVICE: HCPCS | Performed by: OBSTETRICS & GYNECOLOGY

## 2024-03-01 PROCEDURE — A9270 NON-COVERED ITEM OR SERVICE: HCPCS | Performed by: ANESTHESIOLOGY

## 2024-03-01 RX ADMIN — ACETAMINOPHEN 1000 MG: 500 TABLET ORAL at 03:45

## 2024-03-01 RX ADMIN — OXYCODONE 5 MG: 5 TABLET ORAL at 09:44

## 2024-03-01 RX ADMIN — ACETAMINOPHEN 1000 MG: 500 TABLET ORAL at 09:44

## 2024-03-01 RX ADMIN — KETOROLAC TROMETHAMINE 15 MG: 15 INJECTION, SOLUTION INTRAMUSCULAR; INTRAVENOUS at 03:46

## 2024-03-01 RX ADMIN — ACETAMINOPHEN 1000 MG: 500 TABLET ORAL at 15:45

## 2024-03-01 RX ADMIN — IBUPROFEN 800 MG: 800 TABLET, FILM COATED ORAL at 09:44

## 2024-03-01 RX ADMIN — IBUPROFEN 800 MG: 800 TABLET, FILM COATED ORAL at 18:15

## 2024-03-01 RX ADMIN — ACETAMINOPHEN 1000 MG: 500 TABLET ORAL at 22:04

## 2024-03-01 ASSESSMENT — PAIN DESCRIPTION - PAIN TYPE
TYPE: ACUTE PAIN
TYPE: ACUTE PAIN
TYPE: ACUTE PAIN;SURGICAL PAIN
TYPE: SURGICAL PAIN
TYPE: SURGICAL PAIN

## 2024-03-01 NOTE — LACTATION NOTE
Initial Lactation Visit:    Met with Esther to provide lactation support. Ilda began pumping for her baby in the NICU earlier today. She reports that she has not yet been able to express any milk with pump use, but she has been able to hand express small drops.    Pump Evaluation. Settings and pump use reviewed and demonstrated. 25 mm flanges appear to fit appropriately and patient reports comfort @ 25% suction. Pump at speed of 80cpm for 2 min and then turn down to 60cpm for a total of 15min every 2-3hrs. Follow with 2-3 minutes hand expression.     Handouts provided: How to Maximize Milk Production, Pump Parts Washing Guide, Hospital Grade Pump Rental Information, and Milk Storage Guidelines. Benefits of hospital grade pump use discussed. Anticipatory guidance provided regarding typical volumes of colostrum expressed in the first 1-3 days.     Reviewed breast milk labeling and storage requirements within NICU, and Esther is encouraged to request secondary pump kit to allow for pumping at infant's bedside.    Pumping plan:    Pump breasts once every three hours, for a total of 8+ pump sessions per 24 hours.    Esther is to contact her [facility] office to request a hospital grade pump loan for home use. She is aware of access to lactation consultants for duration of Loc's NICU stay.    Esther will call RN/LC for additional lactation support, as needed.

## 2024-03-01 NOTE — PROGRESS NOTES
"62-74\" abdominal binder sent to tube station 32    Contact traction for any questions or concerns.   "

## 2024-03-01 NOTE — PROGRESS NOTES
Assessment completed. Fundus firm, lochia light. Adominal incision with dressing intact clean and dry. Plan of care reviewed with MOB, verbalized understanding. Denies pain at this time, will call if pain medication needed.    MD requests to have  consult with patient regarding lina smiley. Per SW patient does not qualify d/t patients address within proximity to hospital.     0900- patient off unit to ICN.    1100 - patient returned to floor    1630 - patient off unit to ICN with family members

## 2024-03-01 NOTE — LACTATION NOTE
Follow up lactation assistance:    Met with Esther to provide follow up pumping support. She reports that she is now beginning to express more drops with pumping. Repeat pump assessment shows 25mm flanges to be fitting large on nipples. Trial of 22.5mm flanges offered. Patient reports comfort with 22.5 flanges at 25% suction.    Recommended continued pumping, at least once every three hours. Esther is encouraged to begin pumping at bedside, so that she may provide her baby with her expressed drops of colostrum.     Esther states that she has not yet called her WIC office, but she is planning to contact them today regarding pump loan.    She agrees to call for RN/LC assistance as needed for additional pumping support.

## 2024-03-01 NOTE — CARE PLAN
The patient is Stable - Low risk of patient condition declining or worsening    Shift Goals  Clinical Goals: vss, lochia WNL, bond  Patient Goals: pumping  Family Goals: Update on poc and bazan as a family      Problem: Psychosocial - Postpartum  Goal: Patient will verbalize and demonstrate effective bonding and parenting behavior  Outcome: Progressing  Note: MOB has shown adequate bonding with infant, provides pumping q3h, participates in cares.     Problem: Knowledge Deficit - Postpartum  Goal: Patient will verbalize and demonstrate understanding of self and infant care  Outcome: Progressing  Note: MOB demonstrates and verbalizes understanding on how to care for . Asks appropriate questions and calls for help when necessary. Education has been provided to patient.

## 2024-03-01 NOTE — PROGRESS NOTES
Post Partum Progress Note    Name:   Esther Bee   Date/Time:  3/1/2024 - 10:51 AM  Chief Admitting Dx:  Labor and delivery indication for care or intervention [O75.9]  Delivery Type:   for fetal distress, failure to progress, Arrest of Descent, CPD  Chorioamnionitis  Post-Op/Post Partum Days #:  1    Subjective:  Abdominal pain: no  Ambulating:   yes  Tolerating liquids:  yes  Tolerating food:  yes common adult  Flatus:   yes  BM:    no  Bleeding:   without any bleeding  Voiding:   yes  Dizziness:   no  Feeding:   breast    Vitals:    24 2200 24 0200 24 0542 24 0929   BP: 118/75 104/64 113/78 129/84   Pulse: 98 82 70 71   Resp: 19 19 18 15   Temp: 36.2 °C (97.1 °F) 36 °C (96.8 °F) 35.9 °C (96.6 °F) 36.4 °C (97.6 °F)   TempSrc: Temporal Temporal Temporal Temporal   SpO2: 97% 93% 96% 97%   Weight:       Height:           Exam:  Breast: Tenderness no  Abdomen: Abdomen soft, non-tender. BS normal. No masses,  No organomegaly  Fundal Tenderness:  no  Fundus Firm: yes  Incision: dry and intact  Below umbilicus: yes  Perineum: perineum intact  Lochia: mild  Extremities: Normal extremities, peripheral pulses and reflexes normal    Meds:  Current Facility-Administered Medications   Medication Dose    lactated ringers infusion      ibuprofen (Motrin) tablet 800 mg  800 mg    Followed by    [START ON 3/4/2024] ibuprofen (Motrin) tablet 800 mg  800 mg    acetaminophen (Tylenol) tablet 1,000 mg  1,000 mg    Followed by    [START ON 3/4/2024] acetaminophen (Tylenol) tablet 1,000 mg  1,000 mg    oxyCODONE immediate-release (Roxicodone) tablet 5 mg  5 mg    oxyCODONE immediate release (Roxicodone) tablet 10 mg  10 mg    ondansetron (Zofran) syringe/vial injection 4 mg  4 mg    Or    ondansetron (Zofran ODT) dispertab 4 mg  4 mg    diphenhydrAMINE (Benadryl) tablet/capsule 25 mg  25 mg    Or    diphenhydrAMINE (Benadryl) injection 25 mg  25 mg    docusate sodium (Colace) capsule  100 mg  100 mg    tetanus-dipth-acell pertussis (Tdap) inj 0.5 mL  0.5 mL    measles, mumps and rubella vaccine (Mmr) injection 0.5 mL  0.5 mL    bisacodyl (Dulcolax) suppository 10 mg  10 mg    LR infusion      oxytocin (Pitocin) infusion (for post delivery)  125 mL/hr    oxytocin (Pitocin) 0.02 Units/mL LR (induction of labor)  0.5-20 lesley-units/min    ropivacaine 0.2 % (Naropin) injection         Labs:   Recent Labs     24  0530 24  1431   WBC 12.3* 25.2*   RBC 5.12 4.41   HEMOGLOBIN 14.4 12.9   HEMATOCRIT 42.4 36.2*   MCV 82.8 82.1   MCH 28.1 29.3   MCHC 34.0 35.6*   RDW 39.7 40.3   PLATELETCT 258 210   MPV 9.8 9.5       Assessment:  Chief Admitting Dx:  Labor and delivery indication for care or intervention [O75.9]  Delivery Type:   for fetal distress, failure to progress, cephalopelvic disproportion, Chorioamnionitis      Plan:  Continue routine post partum care.  Continue to breast feed  Baby still in the NICU  Planning to stay until POD 4.   SS consult if candidate to stay at [facility]    Gisselle Calderón M.D.

## 2024-03-01 NOTE — PROGRESS NOTES
1900: Report received from DONG Rabago. Assumed care of pt.     2100: Assessment complete. Reinforced expected lochia color and amount and when to call RN. Infant in NICU at this time, reviewed pump settings, and how often and for how long to pump to maximize milk production. Offered emotional support and active listening. Reviewed plan of care, all questions answered at this time.

## 2024-03-01 NOTE — CARE PLAN
The patient is Stable - Low risk of patient condition declining or worsening    Shift Goals  Clinical Goals: Lochia WNL, VSS  Patient Goals: pumping  Family Goals: Update on poc and bazan as a family    Progress made toward(s) clinical / shift goals:    Problem: Infection - Postpartum  Goal: Postpartum patient will be free of signs and symptoms of infection  Outcome: Progressing  Note: Pt with diagnosis of chorio during labor and delivery. VSS at this time, no s/s of infection. Reviewed s/s of infection with pt and when to call either RN or provider when she is discharged home. ABX therapy completed this shift. Will continue with q4 vital signs.      Problem: Altered Physiologic Condition  Goal: Patient physiologically stable as evidenced by normal lochia, palpable uterine involution and vitals within normal limits  Outcome: Progressing  Note: VSS, lochia WNL, uterus is palpable and involuting appropriately.        Patient is not progressing towards the following goals:

## 2024-03-02 PROCEDURE — 700102 HCHG RX REV CODE 250 W/ 637 OVERRIDE(OP): Performed by: OBSTETRICS & GYNECOLOGY

## 2024-03-02 PROCEDURE — 770002 HCHG ROOM/CARE - OB PRIVATE (112)

## 2024-03-02 PROCEDURE — A9270 NON-COVERED ITEM OR SERVICE: HCPCS | Performed by: OBSTETRICS & GYNECOLOGY

## 2024-03-02 RX ADMIN — IBUPROFEN 800 MG: 800 TABLET, FILM COATED ORAL at 18:25

## 2024-03-02 RX ADMIN — DOCUSATE SODIUM 100 MG: 100 CAPSULE, LIQUID FILLED ORAL at 08:39

## 2024-03-02 RX ADMIN — IBUPROFEN 800 MG: 800 TABLET, FILM COATED ORAL at 09:58

## 2024-03-02 RX ADMIN — ACETAMINOPHEN 1000 MG: 500 TABLET ORAL at 16:03

## 2024-03-02 RX ADMIN — ACETAMINOPHEN 1000 MG: 500 TABLET ORAL at 09:58

## 2024-03-02 RX ADMIN — OXYCODONE 5 MG: 5 TABLET ORAL at 08:39

## 2024-03-02 RX ADMIN — ACETAMINOPHEN 1000 MG: 500 TABLET ORAL at 04:05

## 2024-03-02 RX ADMIN — ACETAMINOPHEN 1000 MG: 500 TABLET ORAL at 21:44

## 2024-03-02 RX ADMIN — IBUPROFEN 800 MG: 800 TABLET, FILM COATED ORAL at 02:01

## 2024-03-02 RX ADMIN — DOCUSATE SODIUM 100 MG: 100 CAPSULE, LIQUID FILLED ORAL at 21:44

## 2024-03-02 ASSESSMENT — PAIN DESCRIPTION - PAIN TYPE
TYPE: ACUTE PAIN;SURGICAL PAIN

## 2024-03-02 NOTE — LACTATION NOTE
I met with Esther and her  Stefan this afternoon to discuss lactation. They had several family members present.     Esther continues to use the hospital grade pump every 3 hours and has started seeing drops of milk but unable to collect any yet. She is practicing hand expression and knows to view [employer] videos this evening.     I encouraged them to discuss skin to skin time with their baby Loc when they are visiting in the NICU. Pump hygiene reviewed.    I did fax a request to [facility] to contact her on Monday so she can make arrangements for a pump pick-up.

## 2024-03-02 NOTE — PROGRESS NOTES
The patient is today postoperative day #2 status post primary low-transverse  section.  She tells me this morning that she has been ambulating and urinating and tolerating a regular diet and passing flatus and she says she is having no vaginal bleeding.  She says that other than for some mild soreness she feels fine and has no other problems or complaints.  Vital signs: The patient's vital signs are stable and she is afebrile.  Her temperature this morning was 36.5 °C (97.7 °F) and her heart rate this morning was 72 bpm and her respiratory rate this morning was 18 breaths/min and her pulse oximetry this morning was 96% on room air and her blood pressure this morning was 135/85.  General: The patient appears well-developed and well-nourished and relaxed and alert and comfortable and in no apparent distress.  Assessment:  Postoperative day #2 status post primary  section and the patient appears to be doing well and recovering appropriately.  Plan:  I asked the patient if she would like to go home later today.  It appears that her baby is in the  intensive care unit and I suggested to her that we do not send her home today but consider sending her home either tomorrow or the day after tomorrow and she agreed.  We will continue to give analgesia as needed.  Delfin Cano MD

## 2024-03-02 NOTE — PROGRESS NOTES
Report received from Madyson UMANA. Pt assessment complete. Reviewed scheduled pain medication, ambulation, and call light with the pt. Pt will be going to NICU to visit baby.

## 2024-03-02 NOTE — CARE PLAN
Problem: Altered Physiologic Condition  Goal: Patient physiologically stable as evidenced by normal lochia, palpable uterine involution and vitals within normal limits  Outcome: Progressing     Problem: Early Mobilization - Post Surgery  Goal: Early mobilization post surgery  Outcome: Progressing   The patient is Stable - Low risk of patient condition declining or worsening    Shift Goals  Clinical Goals: pain control; lochia WDL  Patient Goals: pumping  Family Goals: Update on poc and bazan as a family    Progress made toward(s) clinical / shift goals:  pt lochia is WDL. Pt pain has been managed with scheduled pain medication and rest. Pt has been ambulating with a steady gait, independently. Pt ambulates to NICU to visit baby.     Patient is not progressing towards the following goals:

## 2024-03-03 VITALS
HEART RATE: 77 BPM | TEMPERATURE: 97.8 F | BODY MASS INDEX: 38.64 KG/M2 | HEIGHT: 62 IN | RESPIRATION RATE: 18 BRPM | OXYGEN SATURATION: 94 % | SYSTOLIC BLOOD PRESSURE: 135 MMHG | DIASTOLIC BLOOD PRESSURE: 89 MMHG | WEIGHT: 210 LBS

## 2024-03-03 PROBLEM — O33.9: Status: ACTIVE | Noted: 2024-03-03

## 2024-03-03 PROBLEM — O41.1290 CHORIOAMNIONITIS: Status: ACTIVE | Noted: 2024-03-03

## 2024-03-03 PROCEDURE — A9270 NON-COVERED ITEM OR SERVICE: HCPCS | Performed by: OBSTETRICS & GYNECOLOGY

## 2024-03-03 PROCEDURE — 700102 HCHG RX REV CODE 250 W/ 637 OVERRIDE(OP): Performed by: OBSTETRICS & GYNECOLOGY

## 2024-03-03 RX ORDER — OXYCODONE HYDROCHLORIDE AND ACETAMINOPHEN 5; 325 MG/1; MG/1
1 TABLET ORAL EVERY 6 HOURS PRN
Qty: 28 TABLET | Refills: 0 | Status: SHIPPED | OUTPATIENT
Start: 2024-03-03 | End: 2024-03-13

## 2024-03-03 RX ORDER — IBUPROFEN 800 MG/1
800 TABLET ORAL EVERY 8 HOURS PRN
Qty: 30 TABLET | Refills: 0 | Status: SHIPPED | OUTPATIENT
Start: 2024-03-03

## 2024-03-03 RX ADMIN — IBUPROFEN 800 MG: 800 TABLET, FILM COATED ORAL at 02:31

## 2024-03-03 RX ADMIN — ACETAMINOPHEN 1000 MG: 500 TABLET ORAL at 18:18

## 2024-03-03 RX ADMIN — IBUPROFEN 800 MG: 800 TABLET, FILM COATED ORAL at 10:43

## 2024-03-03 RX ADMIN — ACETAMINOPHEN 1000 MG: 500 TABLET ORAL at 10:43

## 2024-03-03 RX ADMIN — ACETAMINOPHEN 1000 MG: 500 TABLET ORAL at 04:10

## 2024-03-03 ASSESSMENT — EDINBURGH POSTNATAL DEPRESSION SCALE (EPDS)
I HAVE BEEN SO UNHAPPY THAT I HAVE BEEN CRYING: NO, NEVER
I HAVE BEEN ANXIOUS OR WORRIED FOR NO GOOD REASON: NO, NOT AT ALL
I HAVE BEEN ABLE TO LAUGH AND SEE THE FUNNY SIDE OF THINGS: AS MUCH AS I ALWAYS COULD
I HAVE FELT SCARED OR PANICKY FOR NO GOOD REASON: NO, NOT AT ALL
I HAVE BEEN SO UNHAPPY THAT I HAVE HAD DIFFICULTY SLEEPING: NOT AT ALL
I HAVE LOOKED FORWARD WITH ENJOYMENT TO THINGS: AS MUCH AS I EVER DID
I HAVE FELT SAD OR MISERABLE: NO, NOT AT ALL
I HAVE BLAMED MYSELF UNNECESSARILY WHEN THINGS WENT WRONG: NO, NEVER
THINGS HAVE BEEN GETTING ON TOP OF ME: NO, I HAVE BEEN COPING AS WELL AS EVER
THE THOUGHT OF HARMING MYSELF HAS OCCURRED TO ME: NEVER

## 2024-03-03 ASSESSMENT — PAIN DESCRIPTION - PAIN TYPE
TYPE: ACUTE PAIN;SURGICAL PAIN

## 2024-03-03 NOTE — PROGRESS NOTES
The patient is today postoperative day #3 status post primary low-transverse  section.  She has been ambulating and urinating and tolerating a regular diet.  Vital signs: The patient's temperature this morning is 36.6 °C (97.8 °F) and her heart rate this morning is 77 bpm and her respiratory rate this morning is 18 breaths/min and her pulse oximetry this morning is 94% on room air and her blood pressure this morning is 135/89.  General: The patient appears well-developed and well-nourished and relaxed and alert and comfortable and in no apparent distress.  Assessment:  Postoperative day #3 status post primary low-transverse  section and the patient appears to be recovering appropriately.  Plan:  Her  is still in the  intensive care unit and it is uncertain as to whether or not he might be discharged home today.  She says she will most likely find out later today whether or not her son will be discharged home today.  I explained to her that if her son is not discharged home then she can stay until tomorrow and she said she would like to stay until tomorrow if her  cannot go home with her today.  Delfin Cano MD

## 2024-03-03 NOTE — CARE PLAN
The patient is Stable - Low risk of patient condition declining or worsening    Shift Goals  Clinical Goals: VSS; pain management  Patient Goals: pain control, ambulation  Family Goals: support    Progress made toward(s) clinical / shift goals:  VSS     Problem: Pain - Standard  Goal: Alleviation of pain or a reduction in pain to the patient’s comfort goal  Outcome: Progressing  Discussed pain management and verbalization of pain utilizing the 0-10 pain scale. White board updated with times of pain medication availability and pt requests pain medication be provided as scheduled andwill call for medication as needed. Discussed non-pharmacological pain control therapies  including splinting with a pillow and light abdominal stretching. Pt encouraged to call for assistance with abdominal binder if needed. Pt ambulates with a steady gait and demonstrates the ability to participate in ADLs and participate in care of  in the NICU.     Problem: Altered Physiologic Condition  Goal: Patient physiologically stable as evidenced by normal lochia, palpable uterine involution and vitals within normal limits  Outcome: Progressing  NOTE: Patient is physiologically stable as evidenced by scant lochia rubra, firm fundus two fingerbreadth below the umbilicus, and vital signs WDL.        Patient is not progressing towards the following goals: NA

## 2024-03-03 NOTE — LACTATION NOTE
"Follow-up Lactation Consult:    Spoke to Esther and Stefan in the NICU, where they were visiting baby Loc.     Esther reports pumping is going well. She pumped 1.5 oz this a.m. She denies nipple/breast pain, but reports breasts are feeling \"harder.\"     Anticipatory guidance provided regarding engorgement care: maintain pumping schedule, gentle effleurage towards axilla, cool compresses, hand express to comfort, NSAIDs as prescribed for postpartum pain relief.     [facility] Breastfeeding Resources handout provided and outpatient lactation care and support [place] options reviewed.     [facility] referral was faxed on 3/2/2024.  "

## 2024-03-03 NOTE — PROGRESS NOTES
Assessment done.  Care plan reviewed and pt progressing according to caremap.  FOB at bedside and very supportive. Infant in NICU. Pt advised of emergency cords in her room .  Pt wearing supportive bra and encouraged to continue.  Pt using peribottle to perineum. Ambulating well by self.  Denies need for pain meds at this time.  See MAR for pain med administration  Pt advised to call for any needs. Parents walking back and forth to and from NICU multiple times to visit infant.  Wants to stay another day since infant in NICU. Using her own breastpump and taking milk to NICU.

## 2024-03-03 NOTE — CARE PLAN
Problem: Altered Physiologic Condition  Goal: Patient physiologically stable as evidenced by normal lochia, palpable uterine involution and vitals within normal limits  Outcome: Progressing  FFU-2, scant lochia , VSS     Problem: Infection - Postpartum  Goal: Postpartum patient will be free of signs and symptoms of infection  Outcome: Progressing  Patient has no signs/symptoms of infection.  Vital signs stable.  Ambulating without difficulty.   The patient is Stable - Low risk of patient condition declining or worsening    Shift Goals  Clinical Goals: VSS; pain management  Patient Goals: pain control; ambulatory  Family Goals: support    Progress made toward(s) clinical / shift goals:  VSS, pain well controlled, ambulating to NICU and back several times this shift, FOB very supportive

## 2024-03-03 NOTE — CARE PLAN
The patient is Stable - Low risk of patient condition declining or worsening    Shift Goals  Clinical Goals: remain clinically stable  Patient Goals: pain control, ambulation  Family Goals: support    Progress made toward(s) clinical / shift goals:    Problem: Pain  Goal: Patient's pain will be alleviated or reduced to the patient’s comfort goal  Outcome: Progressing  Note: Pain has been controlled through scheduled and PRN meds.     Problem: Early Mobilization - Post Surgery  Goal: Early mobilization post surgery  Outcome: Progressing  Note: Pt has been ambulating independently.     Patient is not progressing towards the following goals:

## 2024-03-03 NOTE — PROGRESS NOTES
9127-Report received from nightshift RN.     6570-Assessment per flowsheet completed. Pt reported pain of 5/10 and medicated per MAR. Pt is ambulating and voiding independently. Pt is pumping independently while infant is in NICU. Reviewed POC with pt; questions answered. Pt was encouraged to call with needs or concerns.    1720-Pt and SO to NICU to visit infant.    1822-Pt and SO back to room from NICU.

## 2024-03-03 NOTE — PROGRESS NOTES
Patient assessment completed. Discussed pain management plan and patient requests pain medication be provided as scheduled and will call for breakthrough pain medication as needed. Patient denies dizziness and headaches; states she is voiding w/o difficulty and passing flatus. Reviewed plan of care and all questions answered. Pt and partner report all needs are met at this time.

## 2024-03-04 NOTE — PROGRESS NOTES
Patient education and discharge instructions reviewed with patient and FOB and patient states understanding.  Patient states all questions have been answered and denies any problems.  Patient discharged home in stable condition with all belongings and discharge medications. Visited infant in NICU right before discharge and brought mom pumped milk to NICU.

## 2024-03-04 NOTE — DISCHARGE INSTRUCTIONS

## 2024-03-06 ENCOUNTER — PHARMACY VISIT (OUTPATIENT)
Dept: PHARMACY | Facility: MEDICAL CENTER | Age: 24
End: 2024-03-06
Payer: COMMERCIAL

## 2024-03-06 PROCEDURE — RXMED WILLOW AMBULATORY MEDICATION CHARGE: Performed by: SPECIALIST

## 2024-03-11 NOTE — DISCHARGE SUMMARY
DISCHARGE SUMMARY    PATIENT ID:  NAME:  Esther Bee  MRN:               [id number]  YOB: 2000  DATE OF ADMISSION: 2024   DATE OF DISCHARGE:3/03/2024     DISCHARGE DIAGNOSES:  Stable POD 3  S/p Primary LTCS for FTP, Arrest in descent, CPD    COMPLICATIONS: None.    HOSPITAL COURSE: This is a 23 y.o. year-old female  1, now para 1, who was admitted at 39w1d for active labor.  Her  course was uncomplicated. She underwent above surgical procedure. The patient's postoperative course was uneventful. She remained afebrile with stable vital signs. Pt has been ambulating, tolerating a regular diet and has had return of normal GI function. Pain has been well controlled with percocet and ibuprofen. The wound is healing nicely.       CONDITION: Stable.    DISPOSITION: Home.    ACTIVITY: Slow increase as tolerated. No lifting heavier than baby. Strict pelvic rest, no intercourse or any object inserted into vagina x 5 weeks.    DIET:  as tolerated       MEDICATIONS:    Current Outpatient Medications   Medication Sig Dispense Refill    ibuprofen (MOTRIN) 800 MG Tab Take 1 Tablet by mouth every 8 hours as needed for Mild Pain or Moderate Pain. 30 Tablet 0    Prenatal Vit-DSS-Fe Fum-FA (PRENATAL 19) Tab Take  by mouth.           FOLLOW-UP:  1) 1-2 weeks for incision check   2) Return to hospital if heavy vaginal bleeding, foul smelling discharge or bleeding at surgical site not responsive to pressure is noted.

## 2024-03-12 ENCOUNTER — LACTATION ENCOUNTER (OUTPATIENT)
Dept: OTHER | Facility: MEDICAL CENTER | Age: 24
End: 2024-03-12

## 2024-03-12 NOTE — LACTATION NOTE
This note was copied from a baby's chart.  Lactation Consult:     Latch attempt scheduled during 11:30 care time.  MOB (Esther) has latched 2x prior within last few days with bedside Rns assistance, reports comfortable latch and desires to increase breastfeed sessions if going well. MOB did not pump prior to breastfeeding session.  Breasts are full.     Baby awake showing feeding cues in bassinet after care times.  Nasal canula and cardiac leads/SpO2 in place   With permission unswaddled and placed into cross cradle on left side  Provided pillows to support proper positioning.  Adjusted MOB hands to provide infant's ear, shoulder, hip alignment.  MOB hand expressed colostrum onto upper lip.  Baby  cueing and able to latch deeply after 4 attempts.  Rhythmic jaw noted with frequent swallows, 2:1 suck: swallow ratio. MOB reports comfortable latch, denies pain or pinching.  Baby latched approx 12min prior to independently de-latching.    Nipple round without crease when baby delatched.      MOB burped and attempted on right breast in cross cradle.  Initially baby had uncoordinated suck pattern, but position changes into football hold with a slight elevation allowed to slowly pace and sustained latch including let down with 1:1 suck swallow ratio for 16min, Nipple round without crease when baby delatched.  Milk dribbling out side of mouth with hands relaxed and open.  Placed skin to skin and MOB burped.      MOB states breasts feel less full bilaterally.    Possible discharge tomorrow, planning to room in tonight.     Impression:  Encouraging latch with nutritive suck pattern, if ok with NICU team increase latch to 2-3 attempts per day. Pre/post weight with next latch tomorrow morning at 0830.

## 2024-08-12 NOTE — ANESTHESIA POSTPROCEDURE EVALUATION
Patient: Esther Bee    Procedure Summary       Date: 24 Room / Location: [facility] OR 02 / SURGERY LABOR AND DELIVERY    Anesthesia Start: 1234 Anesthesia Stop: 24 0130    Procedure:  SECTION, PRIMARY (Abdomen) Diagnosis: (failure to descend)    Surgeons: Gisselle Calderón M.D. Responsible Provider: Kaleigh Monique M.D.    Anesthesia Type: epidural ASA Status: 2            Final Anesthesia Type: epidural  Last vitals  BP   Blood Pressure: (!) 143/84    Temp   (!) 39.2 °C (102.6 °F) (MD aware)    Pulse   100   Resp   18    SpO2   96 %      Anesthesia Post Evaluation    Patient location during evaluation: PACU  Patient participation: complete - patient participated  Level of consciousness: awake and alert    Airway patency: patent  Anesthetic complications: no  Cardiovascular status: hemodynamically stable  Respiratory status: acceptable  Hydration status: euvolemic    PONV: none    patient able to participate, but full recovery from regional anesthesia has not occurred and is not expected within the stipulated timeframe for the completion of the evaluation      No notable events documented.     Nurse Pain Score: 4 (NPRS)           Hold capecitabine for cycle 1

## 2025-02-11 ENCOUNTER — HOSPITAL ENCOUNTER (OUTPATIENT)
Dept: LAB | Facility: MEDICAL CENTER | Age: 25
End: 2025-02-11
Payer: COMMERCIAL

## 2025-02-11 LAB
25(OH)D3 SERPL-MCNC: 18 NG/ML (ref 30–100)
ANION GAP SERPL CALC-SCNC: 11 MMOL/L (ref 7–16)
BASOPHILS # BLD AUTO: 0.5 % (ref 0–1.8)
BASOPHILS # BLD: 0.04 K/UL (ref 0–0.12)
BUN SERPL-MCNC: 10 MG/DL (ref 8–22)
CALCIUM SERPL-MCNC: 8.8 MG/DL (ref 8.5–10.5)
CHLORIDE SERPL-SCNC: 101 MMOL/L (ref 96–112)
CHOLEST SERPL-MCNC: 133 MG/DL (ref 100–199)
CO2 SERPL-SCNC: 25 MMOL/L (ref 20–33)
CREAT SERPL-MCNC: 0.5 MG/DL (ref 0.5–1.4)
EOSINOPHIL # BLD AUTO: 0.18 K/UL (ref 0–0.51)
EOSINOPHIL NFR BLD: 2.1 % (ref 0–6.9)
ERYTHROCYTE [DISTWIDTH] IN BLOOD BY AUTOMATED COUNT: 39.2 FL (ref 35.9–50)
GFR SERPLBLD CREATININE-BSD FMLA CKD-EPI: 135 ML/MIN/1.73 M 2
GLUCOSE SERPL-MCNC: 84 MG/DL (ref 65–99)
HCT VFR BLD AUTO: 41.7 % (ref 37–47)
HDLC SERPL-MCNC: 43 MG/DL
HGB BLD-MCNC: 13.6 G/DL (ref 12–16)
IMM GRANULOCYTES # BLD AUTO: 0.02 K/UL (ref 0–0.11)
IMM GRANULOCYTES NFR BLD AUTO: 0.2 % (ref 0–0.9)
LDLC SERPL CALC-MCNC: 77 MG/DL
LYMPHOCYTES # BLD AUTO: 3.02 K/UL (ref 1–4.8)
LYMPHOCYTES NFR BLD: 34.8 % (ref 22–41)
MCH RBC QN AUTO: 29.2 PG (ref 27–33)
MCHC RBC AUTO-ENTMCNC: 32.6 G/DL (ref 32.2–35.5)
MCV RBC AUTO: 89.5 FL (ref 81.4–97.8)
MONOCYTES # BLD AUTO: 0.65 K/UL (ref 0–0.85)
MONOCYTES NFR BLD AUTO: 7.5 % (ref 0–13.4)
NEUTROPHILS # BLD AUTO: 4.76 K/UL (ref 1.82–7.42)
NEUTROPHILS NFR BLD: 54.9 % (ref 44–72)
NRBC # BLD AUTO: 0 K/UL
NRBC BLD-RTO: 0 /100 WBC (ref 0–0.2)
PLATELET # BLD AUTO: 173 K/UL (ref 164–446)
PMV BLD AUTO: 10.8 FL (ref 9–12.9)
POTASSIUM SERPL-SCNC: 3.9 MMOL/L (ref 3.6–5.5)
RBC # BLD AUTO: 4.66 M/UL (ref 4.2–5.4)
SODIUM SERPL-SCNC: 137 MMOL/L (ref 135–145)
TRIGL SERPL-MCNC: 66 MG/DL (ref 0–149)
TSH SERPL-ACNC: 1.2 UIU/ML (ref 0.35–5.5)
WBC # BLD AUTO: 8.7 K/UL (ref 4.8–10.8)

## 2025-02-11 PROCEDURE — 80061 LIPID PANEL: CPT

## 2025-02-11 PROCEDURE — 84443 ASSAY THYROID STIM HORMONE: CPT

## 2025-02-11 PROCEDURE — 85025 COMPLETE CBC W/AUTO DIFF WBC: CPT

## 2025-02-11 PROCEDURE — 80048 BASIC METABOLIC PNL TOTAL CA: CPT

## 2025-02-11 PROCEDURE — 36415 COLL VENOUS BLD VENIPUNCTURE: CPT

## 2025-02-11 PROCEDURE — 82306 VITAMIN D 25 HYDROXY: CPT

## 2025-03-18 ENCOUNTER — RESULTS FOLLOW-UP (OUTPATIENT)
Dept: URGENT CARE | Facility: PHYSICIAN GROUP | Age: 25
End: 2025-03-18

## 2025-03-18 ENCOUNTER — OFFICE VISIT (OUTPATIENT)
Dept: URGENT CARE | Facility: PHYSICIAN GROUP | Age: 25
End: 2025-03-18
Payer: COMMERCIAL

## 2025-03-18 VITALS
HEIGHT: 61 IN | SYSTOLIC BLOOD PRESSURE: 104 MMHG | WEIGHT: 157.5 LBS | RESPIRATION RATE: 12 BRPM | BODY MASS INDEX: 29.74 KG/M2 | DIASTOLIC BLOOD PRESSURE: 60 MMHG | OXYGEN SATURATION: 96 % | TEMPERATURE: 97.2 F | HEART RATE: 110 BPM

## 2025-03-18 DIAGNOSIS — J03.00 STREPTOCOCCAL TONSILLITIS: Primary | ICD-10-CM

## 2025-03-18 LAB
HETEROPH AB SER QL LA: NEGATIVE
POCT INT CON NEG: NEGATIVE
POCT INT CON POS: POSITIVE
S PYO DNA SPEC NAA+PROBE: DETECTED

## 2025-03-18 PROCEDURE — 3078F DIAST BP <80 MM HG: CPT | Performed by: PHYSICIAN ASSISTANT

## 2025-03-18 PROCEDURE — 87651 STREP A DNA AMP PROBE: CPT | Performed by: PHYSICIAN ASSISTANT

## 2025-03-18 PROCEDURE — 86308 HETEROPHILE ANTIBODY SCREEN: CPT | Performed by: PHYSICIAN ASSISTANT

## 2025-03-18 PROCEDURE — 3074F SYST BP LT 130 MM HG: CPT | Performed by: PHYSICIAN ASSISTANT

## 2025-03-18 PROCEDURE — 99203 OFFICE O/P NEW LOW 30 MIN: CPT | Performed by: PHYSICIAN ASSISTANT

## 2025-03-18 RX ORDER — CLINDAMYCIN HYDROCHLORIDE 300 MG/1
300 CAPSULE ORAL 3 TIMES DAILY
Qty: 30 CAPSULE | Refills: 0 | Status: SHIPPED | OUTPATIENT
Start: 2025-03-18 | End: 2025-03-28

## 2025-03-18 ASSESSMENT — FIBROSIS 4 INDEX: FIB4 SCORE: 0.71

## 2025-03-18 NOTE — PROGRESS NOTES
"Subjective:   Esther eBe is a 24 y.o. female who presents for Sore Throat (Painful swallowing, fever, x3 days )      HPI  The patient presents to the Urgent Care with complaints of a sore throat onset 3 days ago.  Gradually worsened.  Hurts to swallow handling her secretions and tolerating fluids.  Not much of a cough.  No nasal symptoms. Has been having headaches as well. Denies any chest pain, shortness of breath, abdominal pain, vomiting, or diarrhea.  History of mono and this feels similar.  She states she was around someone that had a sore throat.        Past Medical History:   Diagnosis Date    Asthma      Allergies   Allergen Reactions    Amoxicillin         Objective:     /60 (BP Location: Left arm, Patient Position: Sitting, BP Cuff Size: Adult)   Pulse (!) 110   Temp 36.2 °C (97.2 °F) (Temporal)   Resp 12   Ht 1.549 m (5' 1\")   Wt 71.4 kg (157 lb 8 oz)   SpO2 96%   BMI 29.76 kg/m²     Physical Exam  HENT:      Mouth/Throat:      Mouth: Mucous membranes are moist.      Pharynx: Uvula midline. Posterior oropharyngeal erythema present. No uvula swelling.      Tonsils: Tonsillar exudate present. No tonsillar abscesses. 2+ on the right. 2+ on the left.   Eyes:      Conjunctiva/sclera: Conjunctivae normal.   Cardiovascular:      Rate and Rhythm: Normal rate and regular rhythm.      Heart sounds: Normal heart sounds.   Pulmonary:      Effort: Pulmonary effort is normal.      Breath sounds: Normal breath sounds.   Musculoskeletal:      Cervical back: Normal range of motion and neck supple. No rigidity.   Lymphadenopathy:      Cervical: Cervical adenopathy present.      Right cervical: Superficial cervical adenopathy present.      Left cervical: Superficial cervical adenopathy present.   Skin:     General: Skin is warm and dry.   Neurological:      General: No focal deficit present.      Mental Status: She is oriented to person, place, and time.   Psychiatric:         Mood and " Affect: Mood normal.         Behavior: Behavior normal.       Results for orders placed or performed in visit on 03/18/25   POCT Cepheid Group A Strep - PCR    Collection Time: 03/18/25 10:35 AM   Result Value Ref Range    POC Group A Strep, PCR Detected (A) Not Detected, Invalid   POCT Mononucleosis (mono)    Collection Time: 03/18/25 10:35 AM   Result Value Ref Range    Heterophile Screen Negative Negative, Invalid    Internal Control Positive Positive     Internal Control Negative Negative        Diagnosis and associated orders:     1. Streptococcal tonsillitis  - POCT Cepheid Group A Strep - PCR  - POCT Mononucleosis (mono)  - clindamycin (CLEOCIN) 300 MG Cap; Take 1 Capsule by mouth 3 times a day for 10 days.  Dispense: 30 Capsule; Refill: 0       Comments/MDM:     POSITIVE Strep A.  Clindamycin.   Recommend warm salt water gargles, soft foods, cool liquids, ibuprofen and Tylenol for any pain or fevers.   Return to the urgent care if symptoms are not improving or any worsening symptoms or concerns. Present to the emergency room or call 911 if any severe swelling of the throat, difficulty swallowing, difficulty breathing, wheezing, or any other severe concerns.          I personally reviewed prior external notes and test results pertinent to today's visit. Pathogenesis of diagnosis discussed including typical length and natural progression. Supportive care, natural history, differential diagnoses, and indications for immediate follow-up discussed. Patient expresses understanding and agrees to plan. Patient denies any other questions or concerns.     Follow-up with the primary care physician for recheck, reevaluation, and consideration of further management.    Please note that this dictation was created using voice recognition software. I have made a reasonable attempt to correct obvious errors, but I expect that there are errors of grammar and possibly content that I did not discover before finalizing the  note.    This note was electronically signed by Lewis Mirza PA-C

## (undated) DEVICE — KIT  I.V. START (100EA/CA)

## (undated) DEVICE — SUTURE 1 CHROMIC GUTCT-1 27 (36PK/BX)"

## (undated) DEVICE — SUTURE 3-0 VICRYL PLUS CT-1 - 36 INCH (36/BX)

## (undated) DEVICE — TUBING CLEARLINK DUO-VENT - C-FLO (48EA/CA)

## (undated) DEVICE — SET EXTENSION WITH 2 PORTS (48EA/CA) ***PART #2C8610 IS A SUBSTITUTE*****

## (undated) DEVICE — WATER IRRIGATION STERILE 1000ML (12EA/CA)

## (undated) DEVICE — CATHETER IV NON-SAFETY 18 GA X 1 1/4 (50/BX 4BX/CA)

## (undated) DEVICE — STAPLER SKIN DISP - (6/BX 10BX/CA) VISISTAT

## (undated) DEVICE — TRAY SPINAL ANESTHESIA NON-SAFETY (10/CA)

## (undated) DEVICE — HEAD HOLDER JUNIOR/ADULT

## (undated) DEVICE — SODIUM CHL IRRIGATION 0.9% 1000ML (12EA/CA)

## (undated) DEVICE — SUTURE 0 VICRYL PLUS CT-1 - 36 INCH (36/BX)

## (undated) DEVICE — GLOVE BIOGEL SZ 6.5 SURGICAL PF LTX (50PR/BX 4BX/CA)

## (undated) DEVICE — DRESSING POST OP BORDER 4 X 10 (5EA/BX)

## (undated) DEVICE — PACK C-SECTION (2EA/CA)

## (undated) DEVICE — ELECTRODE DUAL RETURN W/ CORD - (50/PK)